# Patient Record
Sex: MALE | Race: WHITE | Employment: OTHER | URBAN - METROPOLITAN AREA
[De-identification: names, ages, dates, MRNs, and addresses within clinical notes are randomized per-mention and may not be internally consistent; named-entity substitution may affect disease eponyms.]

---

## 2018-03-28 ENCOUNTER — HOSPITAL ENCOUNTER (OUTPATIENT)
Dept: CARDIAC CATH/INVASIVE PROCEDURES | Age: 58
Discharge: HOME | End: 2018-03-29
Attending: INTERNAL MEDICINE | Admitting: INTERNAL MEDICINE
Payer: COMMERCIAL

## 2018-03-28 DIAGNOSIS — I25.10 CAD, MULTIPLE VESSEL: ICD-10-CM

## 2018-03-28 LAB
ABO/RH: NORMAL
ANTIBODY SCREEN: NORMAL
HCT VFR BLD CALC: 46.5 % (ref 37–54)
HEMOGLOBIN: 16.1 G/DL (ref 12.5–16.5)
INR BLD: 1.1
MCH RBC QN AUTO: 29.3 PG (ref 26–35)
MCHC RBC AUTO-ENTMCNC: 34.6 % (ref 32–34.5)
MCV RBC AUTO: 84.7 FL (ref 80–99.9)
PDW BLD-RTO: 14.4 FL (ref 11.5–15)
PLATELET # BLD: 249 E9/L (ref 130–450)
PMV BLD AUTO: 10.5 FL (ref 7–12)
PROTHROMBIN TIME: 12.2 SEC (ref 9.3–12.4)
RBC # BLD: 5.49 E12/L (ref 3.8–5.8)
WBC # BLD: 7.9 E9/L (ref 4.5–11.5)

## 2018-03-28 PROCEDURE — 86900 BLOOD TYPING SEROLOGIC ABO: CPT

## 2018-03-28 PROCEDURE — 6360000002 HC RX W HCPCS

## 2018-03-28 PROCEDURE — 36415 COLL VENOUS BLD VENIPUNCTURE: CPT

## 2018-03-28 PROCEDURE — 2709999900 HC NON-CHARGEABLE SUPPLY

## 2018-03-28 PROCEDURE — 2500000003 HC RX 250 WO HCPCS

## 2018-03-28 PROCEDURE — C1887 CATHETER, GUIDING: HCPCS

## 2018-03-28 PROCEDURE — 6370000000 HC RX 637 (ALT 250 FOR IP): Performed by: INTERNAL MEDICINE

## 2018-03-28 PROCEDURE — C9600 PERC DRUG-EL COR STENT SING: HCPCS | Performed by: INTERNAL MEDICINE

## 2018-03-28 PROCEDURE — 2580000003 HC RX 258: Performed by: INTERNAL MEDICINE

## 2018-03-28 PROCEDURE — C1769 GUIDE WIRE: HCPCS

## 2018-03-28 PROCEDURE — C1725 CATH, TRANSLUMIN NON-LASER: HCPCS

## 2018-03-28 PROCEDURE — C1874 STENT, COATED/COV W/DEL SYS: HCPCS

## 2018-03-28 PROCEDURE — 85610 PROTHROMBIN TIME: CPT

## 2018-03-28 PROCEDURE — 6370000000 HC RX 637 (ALT 250 FOR IP)

## 2018-03-28 PROCEDURE — 86850 RBC ANTIBODY SCREEN: CPT

## 2018-03-28 PROCEDURE — 93455 CORONARY ART/GRFT ANGIO S&I: CPT | Performed by: INTERNAL MEDICINE

## 2018-03-28 PROCEDURE — 85027 COMPLETE CBC AUTOMATED: CPT

## 2018-03-28 PROCEDURE — C1894 INTRO/SHEATH, NON-LASER: HCPCS

## 2018-03-28 PROCEDURE — 86901 BLOOD TYPING SEROLOGIC RH(D): CPT

## 2018-03-28 RX ORDER — ACETAMINOPHEN 325 MG/1
650 TABLET ORAL EVERY 4 HOURS PRN
Status: DISCONTINUED | OUTPATIENT
Start: 2018-03-28 | End: 2018-03-29 | Stop reason: HOSPADM

## 2018-03-28 RX ORDER — SODIUM CHLORIDE 9 MG/ML
INJECTION, SOLUTION INTRAVENOUS CONTINUOUS
Status: ACTIVE | OUTPATIENT
Start: 2018-03-28 | End: 2018-03-29

## 2018-03-28 RX ORDER — ALLOPURINOL 300 MG/1
300 TABLET ORAL DAILY
Status: DISCONTINUED | OUTPATIENT
Start: 2018-03-29 | End: 2018-03-29 | Stop reason: HOSPADM

## 2018-03-28 RX ORDER — CITALOPRAM 20 MG/1
40 TABLET ORAL DAILY
Status: DISCONTINUED | OUTPATIENT
Start: 2018-03-29 | End: 2018-03-29 | Stop reason: HOSPADM

## 2018-03-28 RX ORDER — ONDANSETRON 2 MG/ML
4 INJECTION INTRAMUSCULAR; INTRAVENOUS EVERY 6 HOURS PRN
Status: DISCONTINUED | OUTPATIENT
Start: 2018-03-28 | End: 2018-03-29 | Stop reason: HOSPADM

## 2018-03-28 RX ORDER — NITROGLYCERIN 0.4 MG/1
0.4 TABLET SUBLINGUAL EVERY 5 MIN PRN
Status: DISCONTINUED | OUTPATIENT
Start: 2018-03-28 | End: 2018-03-29 | Stop reason: HOSPADM

## 2018-03-28 RX ORDER — METOPROLOL SUCCINATE 100 MG/1
100 TABLET, EXTENDED RELEASE ORAL DAILY
Status: DISCONTINUED | OUTPATIENT
Start: 2018-03-29 | End: 2018-03-29 | Stop reason: HOSPADM

## 2018-03-28 RX ORDER — ALPRAZOLAM 0.25 MG/1
0.5 TABLET ORAL 4 TIMES DAILY PRN
Status: DISCONTINUED | OUTPATIENT
Start: 2018-03-28 | End: 2018-03-29 | Stop reason: HOSPADM

## 2018-03-28 RX ORDER — ATORVASTATIN CALCIUM 40 MG/1
80 TABLET, FILM COATED ORAL NIGHTLY
Status: DISCONTINUED | OUTPATIENT
Start: 2018-03-28 | End: 2018-03-29 | Stop reason: HOSPADM

## 2018-03-28 RX ORDER — SODIUM CHLORIDE 9 MG/ML
INJECTION, SOLUTION INTRAVENOUS ONCE
Status: COMPLETED | OUTPATIENT
Start: 2018-03-28 | End: 2018-03-28

## 2018-03-28 RX ORDER — ASPIRIN 81 MG/1
81 TABLET, CHEWABLE ORAL DAILY
Status: DISCONTINUED | OUTPATIENT
Start: 2018-03-29 | End: 2018-03-29 | Stop reason: HOSPADM

## 2018-03-28 RX ORDER — OXYCODONE HYDROCHLORIDE 10 MG/1
10 TABLET ORAL EVERY 6 HOURS PRN
Status: DISCONTINUED | OUTPATIENT
Start: 2018-03-28 | End: 2018-03-29 | Stop reason: HOSPADM

## 2018-03-28 RX ORDER — ENALAPRIL MALEATE 5 MG/1
5 TABLET ORAL DAILY
Status: DISCONTINUED | OUTPATIENT
Start: 2018-03-28 | End: 2018-03-29

## 2018-03-28 RX ADMIN — TIOTROPIUM BROMIDE 18 MCG: 18 CAPSULE ORAL; RESPIRATORY (INHALATION) at 23:57

## 2018-03-28 RX ADMIN — DESMOPRESSIN ACETATE 80 MG: 0.2 TABLET ORAL at 23:56

## 2018-03-28 RX ADMIN — SODIUM CHLORIDE: 9 INJECTION, SOLUTION INTRAVENOUS at 12:37

## 2018-03-28 RX ADMIN — ENALAPRIL MALEATE 5 MG: 5 TABLET ORAL at 21:51

## 2018-03-28 RX ADMIN — TICAGRELOR 90 MG: 90 TABLET ORAL at 23:56

## 2018-03-28 RX ADMIN — SODIUM CHLORIDE: 9 INJECTION, SOLUTION INTRAVENOUS at 21:52

## 2018-03-28 ASSESSMENT — PAIN SCALES - GENERAL
PAINLEVEL_OUTOF10: 0
PAINLEVEL_OUTOF10: 0

## 2018-03-28 NOTE — PROGRESS NOTES
Informed Dr Viry Green of /93.  ordered to resume home meds, including antihypertensives and to call if any further problems.

## 2018-03-29 VITALS
DIASTOLIC BLOOD PRESSURE: 90 MMHG | BODY MASS INDEX: 33.33 KG/M2 | TEMPERATURE: 97.7 F | SYSTOLIC BLOOD PRESSURE: 168 MMHG | HEIGHT: 69 IN | WEIGHT: 225 LBS | OXYGEN SATURATION: 97 % | HEART RATE: 58 BPM | RESPIRATION RATE: 16 BRPM

## 2018-03-29 LAB
ANION GAP SERPL CALCULATED.3IONS-SCNC: 14 MMOL/L (ref 7–16)
BUN BLDV-MCNC: 11 MG/DL (ref 6–20)
CALCIUM SERPL-MCNC: 8.7 MG/DL (ref 8.6–10.2)
CHLORIDE BLD-SCNC: 98 MMOL/L (ref 98–107)
CO2: 25 MMOL/L (ref 22–29)
CREAT SERPL-MCNC: 0.9 MG/DL (ref 0.7–1.2)
GFR AFRICAN AMERICAN: >60
GFR NON-AFRICAN AMERICAN: >60 ML/MIN/1.73
GLUCOSE BLD-MCNC: 89 MG/DL (ref 74–109)
HCT VFR BLD CALC: 44.2 % (ref 37–54)
HEMOGLOBIN: 14.8 G/DL (ref 12.5–16.5)
MAGNESIUM: 2.1 MG/DL (ref 1.6–2.6)
MCH RBC QN AUTO: 28.4 PG (ref 26–35)
MCHC RBC AUTO-ENTMCNC: 33.5 % (ref 32–34.5)
MCV RBC AUTO: 84.8 FL (ref 80–99.9)
PDW BLD-RTO: 14.4 FL (ref 11.5–15)
PLATELET # BLD: 221 E9/L (ref 130–450)
PMV BLD AUTO: 10.7 FL (ref 7–12)
POTASSIUM REFLEX MAGNESIUM: 3.5 MMOL/L (ref 3.5–5)
RBC # BLD: 5.21 E12/L (ref 3.8–5.8)
SODIUM BLD-SCNC: 137 MMOL/L (ref 132–146)
WBC # BLD: 7.5 E9/L (ref 4.5–11.5)

## 2018-03-29 PROCEDURE — 80048 BASIC METABOLIC PNL TOTAL CA: CPT

## 2018-03-29 PROCEDURE — 85027 COMPLETE CBC AUTOMATED: CPT

## 2018-03-29 PROCEDURE — 36415 COLL VENOUS BLD VENIPUNCTURE: CPT

## 2018-03-29 PROCEDURE — 6370000000 HC RX 637 (ALT 250 FOR IP): Performed by: INTERNAL MEDICINE

## 2018-03-29 PROCEDURE — 83735 ASSAY OF MAGNESIUM: CPT

## 2018-03-29 RX ORDER — ENALAPRIL MALEATE 10 MG/1
10 TABLET ORAL DAILY
Qty: 30 TABLET | Refills: 3 | Status: SHIPPED | OUTPATIENT
Start: 2018-03-29

## 2018-03-29 RX ORDER — POTASSIUM CHLORIDE 20 MEQ/1
40 TABLET, EXTENDED RELEASE ORAL ONCE
Status: COMPLETED | OUTPATIENT
Start: 2018-03-29 | End: 2018-03-29

## 2018-03-29 RX ORDER — AMLODIPINE BESYLATE 5 MG/1
5 TABLET ORAL DAILY
Qty: 30 TABLET | Refills: 3 | Status: SHIPPED | OUTPATIENT
Start: 2018-03-29

## 2018-03-29 RX ORDER — ENALAPRIL MALEATE 10 MG/1
10 TABLET ORAL DAILY
Status: DISCONTINUED | OUTPATIENT
Start: 2018-03-29 | End: 2018-03-29 | Stop reason: HOSPADM

## 2018-03-29 RX ORDER — AMLODIPINE BESYLATE 5 MG/1
5 TABLET ORAL DAILY
Status: DISCONTINUED | OUTPATIENT
Start: 2018-03-29 | End: 2018-03-29 | Stop reason: HOSPADM

## 2018-03-29 RX ADMIN — CITALOPRAM 40 MG: 20 TABLET, FILM COATED ORAL at 08:37

## 2018-03-29 RX ADMIN — METOPROLOL SUCCINATE 100 MG: 100 TABLET, FILM COATED, EXTENDED RELEASE ORAL at 12:12

## 2018-03-29 RX ADMIN — ASPIRIN 81 MG 81 MG: 81 TABLET ORAL at 08:37

## 2018-03-29 RX ADMIN — TIOTROPIUM BROMIDE 18 MCG: 18 CAPSULE ORAL; RESPIRATORY (INHALATION) at 08:37

## 2018-03-29 RX ADMIN — POTASSIUM CHLORIDE 40 MEQ: 20 TABLET, EXTENDED RELEASE ORAL at 08:37

## 2018-03-29 RX ADMIN — TICAGRELOR 90 MG: 90 TABLET ORAL at 08:37

## 2018-03-29 RX ADMIN — ALLOPURINOL 300 MG: 300 TABLET ORAL at 08:37

## 2018-03-29 ASSESSMENT — PAIN SCALES - GENERAL
PAINLEVEL_OUTOF10: 0

## 2018-03-29 NOTE — DISCHARGE SUMMARY
atraumatic  NECK:  Supple, symmetrical, trachea midline, no adenopathy, thyroid symmetric, not enlarged and no tenderness, skin normal  LUNGS:  No increased work of breathing, good air exchange, clear to auscultation bilaterally, no crackles or wheezing  CARDIOVASCULAR:  Normal apical impulse, regular rate and rhythm, normal S1 and S2, no S3 or S4, and no murmur noted, no edema, no JVD, no carotid bruit. ABDOMEN:  Soft, nontender, no masses, no hepatomegaly, no splenomegaly, BS+  MUSCULOSKELETAL:  No clubbing no cyanosis. there is no redness, warmth, or swelling of the joints, the relatively narrow loops okay, no swelling, no hematoma, no bruit no thrill  NEUROLOGIC:  Alert, awake,oriented x3  SKIN:  no bruising or bleeding, normal skin color, texture, turgor and no redness, warmth, or swelling    Disposition: home    Patient Instructions:    Ramo Johnson   Home Medication Instructions CPN:[id number]    Printed on:03/29/18 [id number]   Medication Information                      allopurinol (ZYLOPRIM) 300 MG tablet  Take 300 mg by mouth daily. amLODIPine (NORVASC) 5 MG tablet  Take 1 tablet by mouth daily             aspirin 81 MG EC tablet  Take 81 mg by mouth daily. atorvastatin (LIPITOR) 40 MG tablet  Take 40 mg by mouth daily. citalopram (CELEXA) 40 MG tablet               enalapril (VASOTEC) 10 MG tablet  Take 1 tablet by mouth daily             metoprolol (TOPROL-XL) 100 MG XL tablet  Take 100 mg by mouth daily. nitroGLYCERIN (NITROSTAT) 0.4 MG SL tablet  Place 0.4 mg under the tongue every 5 minutes as needed. oxyCODONE HCl (OXY-IR) 10 MG immediate release tablet  Take 1 tablet by mouth 3 times daily as needed for Pain  DO NOT FILL UNTIL 3-23-17. Rosalia Araujo Date: 3/23/17             ticagrelor (BRILINTA) 90 MG TABS tablet  Take 1 tablet by mouth 2 times daily             tiotropium (SPIRIVA) 18 MCG inhalation capsule  Inhale 18 mcg into

## 2018-03-29 NOTE — PROCEDURES
[address]                   [address] [place],  [facility]                              CARDIAC CATHETERIZATION    PATIENT NAME: Darwin Lee                    :        1960  MED REC NO:   [id number]                            ROOM:       6402  ACCOUNT NO:   [de-identified]                           ADMIT DATE: 2018  PROVIDER:     Jet Singh MD    DATE OF PROCEDURE:  2018    PROCEDURES:  1. Coronary angiography. 2.  Surgical grafts angiography. 3.  Percutaneous coronary intervention with a deployment of 2.75 x 18 mm  Xience Alpine drug-eluting stent in ostial and proximal first OM branch. Conscious sedation using Versed and fentanyl. INDICATION:  #4 with score of 7. Indication for PCI 17 with score of 18. HISTORY OF PRESENT ILLNESS:  The patient is a 33-year-old male, smoker with  CAD status post CABG. At the current time, he has been having dyspnea on  exertion and chest discomfort, has an  abnormal stress test, continued to  have symptoms despite his medical therapy, the patient was brought to the  cath lab to evaluate the anatomy of his coronary arteries with the  intention to intervene as warranted. DESCRIPTION OF PROCEDURE:  After the appropriate informed consent, both the  groins were prepped and draped in the usual sterile fashion. The right  groin area was locally anesthetized with 10 mL of 2% lidocaine. A Cook  needle was used to access the right common femoral artery using real-time  ultrasound guidance. A 6-Citizen of [place] introducer sheath was used to cannulate  the right common femoral artery. A 6-Citizen of [place] JL-4 and 6-Citizen of [place] JR-4  catheter were used for coronary angiography in multiple projections.   A  6-Citizen of [place] LIMA catheter was used for LIMA to LAD and LIMA to OM II  angiography and also for saphenous venous graft angiogram.    ANGIOGRAPHIC FINDINGS:  The left main coronary artery arising from the left sinus of Valsalva. It  is a good-sized vessel without any significant disease. It bifurcates into  left anterior descending artery and left circumflex artery. The left anterior descending artery has proximal total occlusion. The left circumflex artery is a large vessel, gives off a large first OM  branch. The first OM branch has 80-90% hazy long lesion in the ostium and  the proximal segment. The rest of the left circumflex artery has mild  disease. There is total occlusion of the second OM branch. The right coronary artery has inferior takeoff. It has a chronic total  occlusion. There is retrograde filling of the distal SVG to PDA. The right brachiocephalic trunk is tortuous, LUNDBERG to LAD is a mature vessel  without any disease. There is antegrade and retrograde filling of the LAD  and a large diagonal branch. The LIMA to OM II is also a mid Shore vessel without any significant  disease. After completing the coronary angiography, we proceeded with a percutaneous  coronary intervention. We tried to use a JL-4 guiding catheter but  unsuccessfully due to lack of support. Then we used CLS 3.5 catheter, BMW  wire was used to cross the lesion, the lesion was predilated using 2.0 x 15  mm balloon. This lesion was stented using 2.75 x 18 mm Xience Alpine  drug-eluting stent. There was 0% residual stenosis and MANDEEP III flow  distally. There was mild disease distal to the stent that was left alone  for medical therapy. COMPLICATIONS:  None. MEDICATIONS USED DURING THE PROCEDURE:  We used Angiomax for  anticoagulation, the patient was given 325 mg of aspirin and 180 mg of  Brilinta prior to leaving the cath lab area. CONSCIOUS SEDATION:  We used Versed and fentanyl for conscious sedation,  first dose was given at 15:12, procedure ended at 16:21, there was 69  minutes of direct face-to-face supervision during conscious sedation  administration. IMPRESSION:  1.   Significant disease involving

## 2018-04-18 LAB
EKG ATRIAL RATE: 51 BPM
EKG P AXIS: 55 DEGREES
EKG P-R INTERVAL: 196 MS
EKG Q-T INTERVAL: 526 MS
EKG QRS DURATION: 146 MS
EKG QTC CALCULATION (BAZETT): 484 MS
EKG R AXIS: 33 DEGREES
EKG T AXIS: 52 DEGREES
EKG VENTRICULAR RATE: 51 BPM

## 2023-06-26 ENCOUNTER — HOSPITAL ENCOUNTER (OUTPATIENT)
Dept: NUCLEAR MEDICINE | Age: 63
Discharge: HOME | End: 2023-06-26
Attending: INTERNAL MEDICINE
Payer: MEDICARE

## 2023-06-26 DIAGNOSIS — R91.1 SOLITARY PULMONARY NODULE: ICD-10-CM

## 2023-06-26 PROCEDURE — A9552 F18 FDG: HCPCS | Performed by: RADIOLOGY

## 2023-06-26 PROCEDURE — 3430000000 HC RX DIAGNOSTIC RADIOPHARMACEUTICAL: Performed by: RADIOLOGY

## 2023-06-26 RX ORDER — FLUDEOXYGLUCOSE F 18 200 MCI/ML
10 INJECTION, SOLUTION INTRAVENOUS
Status: COMPLETED | OUTPATIENT
Start: 2023-06-26 | End: 2023-06-26

## 2023-06-26 RX ADMIN — FLUDEOXYGLUCOSE F 18 10 MILLICURIE: 200 INJECTION, SOLUTION INTRAVENOUS at 16:13

## 2023-07-24 ENCOUNTER — HOSPITAL ENCOUNTER (OUTPATIENT)
Age: 63
Discharge: HOME | End: 2023-07-24
Payer: MEDICARE

## 2023-07-24 LAB
ANION GAP SERPL CALCULATED.3IONS-SCNC: 9 MMOL/L (ref 7–16)
BUN SERPL-MCNC: 11 MG/DL (ref 6–23)
CALCIUM SERPL-MCNC: 9.5 MG/DL (ref 8.6–10.2)
CHLORIDE SERPL-SCNC: 95 MMOL/L (ref 98–107)
CO2 SERPL-SCNC: 29 MMOL/L (ref 22–29)
CREAT SERPL-MCNC: 1.1 MG/DL (ref 0.7–1.2)
D DIMER: <200 NG/ML DDU (ref 0–232)
ERYTHROCYTE [DISTWIDTH] IN BLOOD BY AUTOMATED COUNT: 14 % (ref 11.5–15)
FIBRINOGEN PPP-MCNC: 366 MG/DL (ref 200–400)
GFR SERPL CREATININE-BSD FRML MDRD: >60 ML/MIN/1.73M2
GLUCOSE SERPL-MCNC: 99 MG/DL (ref 74–99)
HCT VFR BLD AUTO: 46.8 % (ref 37–54)
HGB BLD-MCNC: 15.5 G/DL (ref 12.5–16.5)
INR PPP: 1
MCH RBC QN AUTO: 29.5 PG (ref 26–35)
MCHC RBC AUTO-ENTMCNC: 33.1 G/DL (ref 32–34.5)
MCV RBC AUTO: 89 FL (ref 80–99.9)
PARTIAL THROMBOPLASTIN TIME: 26.6 SEC (ref 24.5–35.1)
PLATELET # BLD AUTO: 202 K/UL (ref 130–450)
PMV BLD AUTO: 9.8 FL (ref 7–12)
POTASSIUM SERPL-SCNC: 3.8 MMOL/L (ref 3.5–5)
PROTHROMBIN TIME: 11.8 SEC (ref 9.3–12.4)
RBC # BLD AUTO: 5.26 M/UL (ref 3.8–5.8)
SODIUM SERPL-SCNC: 133 MMOL/L (ref 132–146)
WBC OTHER # BLD: 6.5 K/UL (ref 4.5–11.5)

## 2023-07-24 PROCEDURE — 80048 BASIC METABOLIC PNL TOTAL CA: CPT

## 2023-07-24 PROCEDURE — 85610 PROTHROMBIN TIME: CPT

## 2023-07-24 PROCEDURE — 85730 THROMBOPLASTIN TIME PARTIAL: CPT

## 2023-07-24 PROCEDURE — 85027 COMPLETE CBC AUTOMATED: CPT

## 2023-07-24 PROCEDURE — 85384 FIBRINOGEN ACTIVITY: CPT

## 2023-07-24 PROCEDURE — 36415 COLL VENOUS BLD VENIPUNCTURE: CPT

## 2023-07-24 PROCEDURE — 85379 FIBRIN DEGRADATION QUANT: CPT

## 2023-07-27 ENCOUNTER — HOSPITAL ENCOUNTER (OUTPATIENT)
Dept: DATA CONVERSION | Facility: HOSPITAL | Age: 63
End: 2023-07-27
Attending: INTERNAL MEDICINE | Admitting: INTERNAL MEDICINE

## 2023-07-27 DIAGNOSIS — R09.89 OTHER SPECIFIED SYMPTOMS AND SIGNS INVOLVING THE CIRCULATORY AND RESPIRATORY SYSTEMS: ICD-10-CM

## 2023-07-27 DIAGNOSIS — R91.8 OTHER NONSPECIFIC ABNORMAL FINDING OF LUNG FIELD: ICD-10-CM

## 2023-07-27 DIAGNOSIS — R59.0 LOCALIZED ENLARGED LYMPH NODES: ICD-10-CM

## 2023-07-27 LAB
BASOPHILS (10*3/UL) IN BLOOD BY AUTOMATED COUNT: NORMAL
BASOPHILS/100 LEUKOCYTES IN BLOOD BY AUTOMATED COUNT: NORMAL
CD4/CD8 RATIO: NORMAL
CD45%: NORMAL
CELLS COUNTED TOTAL (#) IN BODY FLUID: 100
CLARITY FLUID: NORMAL
COLOR OF BODY FLUID: COLORLESS
EOSINOPHILS (10*3/UL) IN BLOOD BY AUTOMATED COUNT: NORMAL
EOSINOPHILS/100 LEUKOCYTES IN BLOOD BY AUTOMATED COUNT: NORMAL
EOSINOPHILS/100 LEUKOCYTES IN BODY FLUID BY MANUAL COUNT: 3 %
ERYTHROCYTE DISTRIBUTION WIDTH (RATIO) BY AUTOMATED COUNT: NORMAL
ERYTHROCYTE MEAN CORPUSCULAR HEMOGLOBIN CONCENTRATION (G/DL) BY AUTOMATED: NORMAL
ERYTHROCYTE MEAN CORPUSCULAR VOLUME (FL) BY AUTOMATED COUNT: NORMAL
ERYTHROCYTES (/UL) IN BODY FLUID: 1000 /UL
ERYTHROCYTES (10*6/UL) IN BLOOD BY AUTOMATED COUNT: NORMAL
FMETH: NORMAL
FSIT1: NORMAL
HEMATOCRIT (%) IN BLOOD BY AUTOMATED COUNT: NORMAL
HEMOGLOBIN (G/DL) IN BLOOD: NORMAL
IMMATURE GRANULOCYTES/100 LEUKOCYTES IN BLOOD BY AUTOMATED COUNT: NORMAL
LEUKOCYTES (/UL) IN BODY FLUID: 191 /UL
LEUKOCYTES (10*3/UL) IN BLOOD BY AUTOMATED COUNT: NORMAL
LYMPHOCYTES (10*3/UL) IN BLOOD BY AUTOMATED COUNT: NORMAL
LYMPHOCYTES/100 LEUKOCYTES IN BLOOD BY AUTOMATED COUNT: NORMAL
LYMPHOCYTES/100 LEUKOCYTES IN BODY FLUID BY MAN CT: 16 %
MANUAL DIFFERENTIAL Y/N: NORMAL
MARKER INTERPRETATION: NORMAL
MONOCYTES (10*3/UL) IN BLOOD BY AUTOMATED COUNT: NORMAL
MONOCYTES+MACROPHAGES/100 WBC IN BODY FLUID BY MAN CT: 69 %
MONOCYTES/100 LEUKOCYTES IN BLOOD BY AUTOMATED COUNT: NORMAL
NEUTROPHILS (10*3/UL) IN BLOOD BY AUTOMATED COUNT: NORMAL
NEUTROPHILS/100 LEUKOCYTES IN BLOOD BY AUTOMATED COUNT: NORMAL
NEUTROPHILS/100 LEUKOCYTES IN BODY FLUID BY MANUAL COUNT: 12 %
NRBC (PER 100 WBCS) BY AUTOMATED COUNT: NORMAL
PLATELETS (10*3/UL) IN BLOOD AUTOMATED COUNT: NORMAL

## 2023-07-28 LAB
CD4/CD8 RATIO: 0.2 (ref 1–3.5)
CD45%: 100 %
CP CD3+CD4+%: 15 % (ref 29–57)
CP CD3+CD8+%: 76 % (ref 7–31)
FMETH: ABNORMAL
FSIT1: ABNORMAL
MARKER INTERPRETATION: ABNORMAL

## 2023-07-29 LAB
CELL CT,FLUID PATH REVIEW: NORMAL
GRAM STAIN: NORMAL
RESPIRATORY CULTURE/SMEAR: NORMAL

## 2023-07-31 LAB
FUNGAL CULTURE/SMEAR: ABNORMAL
FUNGAL SMEAR: ABNORMAL

## 2023-08-03 LAB
CCOLL: NORMAL PER TUBE
CCOUN: 6.17 X10E9/L
COMPLETE PATHOLOGY REPORT: NORMAL
COMPLETE PATHOLOGY REPORT: NORMAL
CONVERTED ADDENDUM DIAGNOSIS 2: NORMAL
CONVERTED CLINICAL DIAGNOSIS-HISTORY: NORMAL
CONVERTED CLINICAL DIAGNOSIS-HISTORY: NORMAL
CONVERTED DIAGNOSIS COMMENT: NORMAL
CONVERTED FINAL DIAGNOSIS: NORMAL
CONVERTED FINAL DIAGNOSIS: NORMAL
CONVERTED FINAL REPORT PDF LINK TO COPY AND PASTE: NORMAL
CONVERTED FINAL REPORT PDF LINK TO COPY AND PASTE: NORMAL
CONVERTED GROSS DESCRIPTION: NORMAL
CONVERTED RAPID EVALUATION: NORMAL
CONVERTED SPECIMEN DESCRIPTION: NORMAL
FCTOR: NORMAL
FCTSO: NORMAL
FSITE: NORMAL
LANTB: NORMAL
LCD19: 35 % OF LYMPH
LCD4: 52 % OF LYMPH
LCD8: 11 % OF LYMPH
LGPD1: NORMAL
LGPNO: NORMAL
LNK: 2 % OF LYMPH
LPERC: 84 %
PV194: NORMAL
VIAB: NORMAL

## 2023-09-19 LAB
AFB CULTURE: NORMAL
AFB STAIN: NORMAL

## 2023-09-29 VITALS — WEIGHT: 209.44 LBS | BODY MASS INDEX: 31.74 KG/M2 | HEIGHT: 68 IN

## 2023-11-22 ENCOUNTER — APPOINTMENT (OUTPATIENT)
Dept: PULMONOLOGY | Facility: HOSPITAL | Age: 63
End: 2023-11-22
Payer: MEDICARE

## 2024-06-10 ENCOUNTER — HOSPITAL ENCOUNTER (OUTPATIENT)
Dept: NUCLEAR MEDICINE | Age: 64
Discharge: HOME | End: 2024-06-10
Attending: INTERNAL MEDICINE
Payer: MEDICARE

## 2024-06-10 DIAGNOSIS — R91.1 SOLITARY PULMONARY NODULE: ICD-10-CM

## 2024-06-10 PROCEDURE — A9609 HC RX DIAGNOSTIC RADIOPHARMACEUTICAL: HCPCS | Performed by: RADIOLOGY

## 2024-06-10 PROCEDURE — 78815 PET IMAGE W/CT SKULL-THIGH: CPT

## 2024-06-10 PROCEDURE — 3430000000 HC RX DIAGNOSTIC RADIOPHARMACEUTICAL: Performed by: RADIOLOGY

## 2024-06-10 RX ORDER — FLUDEOXYGLUCOSE F 18 200 MCI/ML
10 INJECTION, SOLUTION INTRAVENOUS
Status: COMPLETED | OUTPATIENT
Start: 2024-06-10 | End: 2024-06-10

## 2024-06-10 RX ADMIN — FLUDEOXYGLUCOSE F 18 10 MILLICURIE: 200 INJECTION, SOLUTION INTRAVENOUS at 15:48

## 2024-07-11 PROBLEM — R59.0 MEDIASTINAL ADENOPATHY: Status: ACTIVE | Noted: 2024-07-11

## 2024-07-11 NOTE — PROGRESS NOTES
Bronchoscopy Scheduling Request    Pre-bronchoscopy visit: New patient visit with Bronchoscopy group provider  Please schedule procedure: Next available    Cytology on-site:  Yes  Location:  Either location  Performing physician:  Advanced diagnostic bronchoscopist  Referring physician:   Dr. Asad Muse  Indication: Mediastinal LAD  Sedation / Anesthesia:  GA  Procedure:  Airway Examination, Diagnostic EBUS  Time:  Tier 2  Fluorscopy:   No  Imaging needed:  None  Labs:  CBC, BMP  Meds:  None  Special Considerations:  PAT; Signification Emphysema; please confirm medication list/history for bronch; Consider BAL, EBBx if possible sarcoid protocol; had bronch 7/2023- extensive workup sent from LAD, even flow, re-evaluate testing needs, now also having hemoptysis per referral.    Reviewed by:  Kimberly Serrano MD

## 2024-07-16 NOTE — PROGRESS NOTES
Patient: Sam Patterson    [id number]  : 1960 -- AGE 63 y.o.    Provider: SARAH Mcrae-CNP     Location [facility]   Service Date: 2024       Department of Medicine  Division of Pulmonary, Critical Care, and Sleep Medicine       [facility]  New Visit Note    Virtual or Telephone Consent  A telephone visit (audio only) between the patient (at the originating site) and the provider (at the distant site) was utilized to provide this telehealth service.   Verbal consent was requested and obtained from Sam Patterson on this date, 24 for a telehealth visit.     HISTORY OF PRESENT ILLNESS     Pulm: Dr. Asad Muse  Cardiology: Dr. Estephanie Corrales     HISTORY OF PRESENT ILLNESS   Sam Patterson is a 63 y.o. male who presents to a [facility] for an evaluation with concerns of Mediastinal adenopathy.  I have independently interviewed and examined the patient in the office and reviewed available records.    Current History  Current pulmonary patient of Dr. Asad Muse; referred to the  interventional pulmonary team with concern for mediastinal adenopathy. Most recent PET CT from 6/10/24 showing mildly increased hypermetabolic activity within enlarged thoracic lymph nodes upon comparison from 23 PET CT. Patient had previously underwent bronchoscopy with our team last year on 23 in which pathology and findings were benign/inconclusive. Patient was referred back by his pulmonologist for further assessment and tissue biopsy. He also does have a notable hx of colon CA.    On today's visit, the patient reports no SOB at rest but does have MANN; worse when walking up stairs or carrying in items in to the house. Takes Symbicort 160 daily. Will use Albuterol HFA usually 1-2x a week. Reports cough. Intermittent productive; yellow. A few weeks ago, was having some hemoptysis (mostly streaks of  blood); but has since resolved. Hx of CABG x4 (2002) and PCI (2016). He takes Plavix daily (last dose taken this morning). Intermittent orthopnea. No lower leg swelling. Has GERD but controlled on Pepcid. Intermittent chest tightness; last took nitroglycerin 1 week ago. Sees cardiology.     Denies premature birth. No childhood pulmonary issues growing up. No pulmonary hospitalizations. Has never been on home oxygen therapy before.    He does have known severe KARI; split night study on 1/29/24. Wears CPAP intermittently.      Prior Notes & History       The above is a note from pulmonologist Dr. Asad Muse    Pulmonary Note (Jasmyne Morrison, CNP)  7/25/23: History of Present Illness     Mr. Sam Patterson is a 62 y.o man, former smoker (1.5-2 PPD for 40 years, quit at age 62 ), being evaluated for lung nodules.      PCP: Asad Muse     HPI:  He was informed he has multiple nodules requiring bronchoscopy. At baseline, he has dyspnea on exertion, but occ at rest. His symptoms started many years ago, but has mostly been stable. He currently sits for most of the day, works inside the house, but does not carry loads and do strenuous exercise. He has to stop for breath when walking at her own pace on level ground at about 15 minutes (mMRC 2). He denies orthopnea, pnd, or betsy. He has gained X10 pounds in the last X 12 months. He also relates chronic productive cough with yellow green, denies wheezing/ blood streaks. C/o night cough improved with stopping smoking. No hemoptysis. No fever or shivering chills. He has no runny nose, or a tingling sensation in the back of his throat. Also denies heartburn. He has occ left sided chest pain. reports headache intense does not last long.         Previous pulmonary history:   He has no history of recurrent infections, or lung disease as a child. He had no previous lung hx, never on oxygen or inhaler therapy.      Inhalers/nebulized medications: Spiriva       Hospitalization History:  He has not been hospitalized over the last year for breathing related problem.     Sleep history:  Denies snoring, apnea, feeling tired during the day or taking naps during the day.      Comorbidities:  colon cancer in 2008 - colectomy and adjuvant chemo  lung nodules  CABG 2002 x 4, CAD  HLD  COPD     SH:  smoking: former smoker (1.5-2 PPD for 40 years, quit at age 62  drinking: none  illicit drug use: occ MJ 1-2 week     Occupation:   Previously worked as  for paint shop and a sandblaster - [employer]  and   Currently works as disabled      Family History:  father  brain tumor  two sisters have breast cancer   brother daughter breast cancer   mom ovarian cancer   Does not recall if family was tested for BRCA gene  REVIEW OF SYSTEMS     REVIEW OF SYSTEMS  Review of Systems   Constitutional:  Positive for fatigue. Negative for activity change, appetite change, chills, fever and unexpected weight change.   HENT:  Negative for congestion, postnasal drip, rhinorrhea, sinus pressure, sinus pain, sneezing, sore throat, trouble swallowing and voice change.         Denies throat clearing   Eyes:  Negative for redness and itching.   Respiratory:  Positive for cough, chest tightness and shortness of breath. Negative for wheezing and stridor.    Cardiovascular:  Positive for chest pain. Negative for palpitations and leg swelling.        Denies orthopnea   Gastrointestinal:  Negative for abdominal pain, diarrhea, nausea and vomiting.        Denies acid reflux   Musculoskeletal:  Positive for arthralgias and back pain. Negative for joint swelling and myalgias.   Skin:  Negative for rash.   Allergic/Immunologic: Negative for immunocompromised state.   Neurological:  Negative for dizziness, tremors, weakness and headaches.   Hematological:  Bruises/bleeds easily.   Psychiatric/Behavioral:  Positive for sleep disturbance. Negative for agitation. The patient is  not nervous/anxious.         Denies depression   All other systems reviewed and are negative.      ALLERGIES AND MEDICATIONS     ALLERGIES  No Known Allergies    MEDICATIONS  Current Outpatient Medications   Medication Sig Dispense Refill    albuterol 90 mcg/actuation inhaler Inhale 2 puffs every 4 hours if needed.      allopurinol (Zyloprim) 300 mg tablet Take 1 tablet (300 mg) by mouth once daily.      citalopram (CeleXA) 40 mg tablet Take 1 tablet (40 mg) by mouth once daily.      clopidogrel (Plavix) 75 mg tablet Take 1 tablet (75 mg) by mouth once daily.      enalapril (Vasotec) 20 mg tablet Take 1 tablet (20 mg) by mouth once daily.      famotidine (Pepcid) 40 mg tablet Take 1 tablet (40 mg) by mouth early in the morning..      hydroCHLOROthiazide (HYDRODiuril) 25 mg tablet Take 1 tablet (25 mg) by mouth once daily.      isosorbide dinitrate (Isordil) 10 mg tablet Take 1 tablet (10 mg) by mouth once daily.      metoprolol succinate XL (Toprol-XL) 100 mg 24 hr tablet Take 1 tablet (100 mg) by mouth 2 times a day.      nitroglycerin (Nitrostat) 0.4 mg SL tablet Place 1 tablet (0.4 mg) under the tongue every 5 minutes if needed for chest pain.      oxyCODONE (Roxicodone) 10 mg immediate release tablet Take 1 tablet (10 mg) by mouth every 8 hours if needed for moderate pain (4 - 6) or severe pain (7 - 10).      rosuvastatin (Crestor) 20 mg tablet Take 1 tablet (20 mg) by mouth once daily.      Symbicort 160-4.5 mcg/actuation inhaler Inhale 2 puffs 2 times a day. Rinse mouth after use       No current facility-administered medications for this visit.       PAST HISTORY     PAST MEDICAL HISTORY  He  has a past medical history of Colon cancer (Multi), COPD (chronic obstructive pulmonary disease) (Multi), Depression, Emphysema lung (Multi), Hyperlipidemia, Hypertension, and MI (myocardial infarction) (Multi).    PAST SURGICAL HISTORY  Past Surgical History:   Procedure Laterality Date    COLON SURGERY      CORONARY  ANGIOPLASTY WITH STENT PLACEMENT         IMMUNIZATION HISTORY  Immunization History   Administered Date(s) Administered    Flu vaccine (IIV4), preservative free *Check age/dose* 10/03/2018, 09/10/2020, 11/22/2021    Flu vaccine, quadrivalent, high-dose, preservative free, age 65y+ (FLUZONE) 02/28/2024    Flu vaccine, quadrivalent, no egg protein, age 6 month or greater (FLUCELVAX) 09/27/2022    Influenza Whole 09/02/2014    Influenza, injectable, quadrivalent 10/03/2019    Influenza, seasonal, injectable, preservative free 11/07/2015, 10/18/2016    Moderna COVID-19 vaccine, bivalent, blue cap/gray label *Check age/dose* 03/07/2023    Moderna SARS-CoV-2 Vaccination 02/25/2021, 03/25/2021, 11/12/2021    Novel influenza-H1N1-09, preservative-free 12/03/2009    Pneumococcal conjugate vaccine, 13-valent (PREVNAR 13) 11/07/2015    Pneumococcal conjugate vaccine, 20-valent (PREVNAR 20) 09/27/2022    RSV, 60 Years And Older (AREXVY) 12/02/2023       SOCIAL HISTORY  He  reports that he quit smoking about 12 months ago. His smoking use included cigarettes. He started smoking about 51 years ago. He has a 126.2 pack-year smoking history. He has been exposed to tobacco smoke. He has never used smokeless tobacco. He reports current alcohol use. He reports current drug use. Drug: Marijuana.     FAMILY HISTORY  No family history on file.      PHYSICAL EXAM       PREVIOUS WEIGHTS:  Wt Readings from Last 3 Encounters:   No data found for Wt       Physical Exam  No physical exam performed; virtual visit.    RESULTS/DATA     Pulmonary Function Test Results     No PFT on record    Chest Radiograph     No results found for this or any previous visit from the past 365 days.      Chest CT Scan   PET CT  6/10/24: Impression: Mild interval increase in metabolic activity within enlarged thoracic lymph nodes since 06/26/2023.No new hypermetabolic foci are identified in the neck, chest chest abdomen or pelvis   6/26/23: Hypermetabolic lymph  "nodes throughout the mediastinum and at the jaci bilaterally, and to a lesser extent within the cervical chains and inguinal regions, for which lymphoma, chantale metastatic disease, or sarcoid are among leading differential considerations. Nonspecific diffuse activity is seen associated with right-sided airspace disease and left pleural thickening, potentially inflammatory.  Continued attention on CT follow-up is recommended.     Echocardiogram & Cardiac Studies     No results found for this or any previous visit from the past 365 days.       Labwork & Pathology   Complete Blood Count  Lab Results   Component Value Date    WBC CANCELED 07/27/2023    HGB CANCELED 07/27/2023    HCT CANCELED 07/27/2023    MCV CANCELED 07/27/2023    PLT CANCELED 07/27/2023       Peripheral Eosinophil Count:   Eosinophils Absolute (no units)   Date Value   07/27/2023 CANCELED       Immunocap IgE  No results found for: \"ICIGE\"    Bronchoscopy & Pathology/Cultures   Pathology  7/27/23: FINAL DIAGNOSIS   A: LYMPH NODE, 11 RS, CORE BIOPSY:   --  LYMPH NODE WITH FOLLICULAR AND PARAFOLLICULAR HYPERPLASIA, FAVOR REACTIVE.     Respiratory Culture  Lab Results   Component Value Date    RESPCULTSM  07/27/2023       ~NORMAL THROAT ZE, CULTURE IN PROGRESS.~  2+ NORMAL THROAT ZE.    GRAMSTAIN NO GRANULOCYTES OR ORGANISMS SEEN. 07/27/2023     No results found for the last 90 days.      Fungal Culture  Lab Results   Component Value Date    FUNGALCULTSM Candida glabrata (A) 07/27/2023    FUNGALSMEAR FLUORESCENT FUNGAL STAIN: NEGATIVE 07/27/2023       AFB Culture  Lab Results   Component Value Date    AFBCX NO MYCOBACTERIA ISOLATED. 07/27/2023    AFBSTAIN ACID FAST SMEAR - NEGATIVE. 07/27/2023         ASSESSMENT/PLAN     Mr. Patterson is a 63 y.o. male; was referred to the [facility] for evaluation of mediastinal adenopathy.    Problem List and Orders  Diagnoses and all orders for this visit:  Mediastinal " adenopathy      Assessment and Plan / Recommendations:  Patient's visit was converted to a virtual visit.    1. Mediastinal Adenopathy: Current pulmonary patient of Dr. Asad Muse; referred to the  interventional pulmonary team with concern for mediastinal adenopathy. Most recent PET CT from 6/10/24 showing mildly increased hypermetabolic activity within enlarged thoracic lymph nodes upon comparison from 6/26/23 PET CT. Patient had previously underwent bronchoscopy with our team last year on 7/27/23 in which pathology and findings were benign/inconclusive. Patient was referred back by his pulmonologist for further assessment and tissue biopsy. He also does have a notable hx of colon CA.  - Plan for bronchoscopy with biopsy. Airway Examination, Diagnostic EBUS    - Lab work prior to procedure; patient knows this needs completed  - Patient is on Plavix; he took a dose today on 7/19/24. (Case will need to be rescheduled due to this)  - Patient also reports he has intermittent chest pains; last took nitroglycerin 1 week ago. Sees a cardiologist. Hx of CABG x4 (2002) and PCI in 2016.   - These concerns were communicated to Dr. Wood and bronchoscopy coordinators.   Special Considerations:  Signification Emphysema; please confirm medication list/history for bronch; Consider BAL, EBBx if possible sarcoid protocol; had bronch 7/2023- extensive workup sent from LAD, even flow, re-evaluate testing needs, now also having hemoptysis per referral.       I explained the procedure to the patient. We discussed that the bronchoscopy will be performed by a member of the Interventional Pulmonary Team; depending on scheduling and provider availability. We also discussed that the IP providers function as a team and not infrequently may have to fill in for one another if there are emergent issues that need attention at the same time. The patient / family expressed understanding and agreed to proceed. All questions were answered.      Patient's visit was converted to a virtual visit. Spoke with the patient via phone for 23 minutes.    Prep: 10 minutes  Phone/Video: 23 minutes  Total: 33 minutes

## 2024-07-19 ENCOUNTER — TELEMEDICINE (OUTPATIENT)
Dept: PULMONOLOGY | Facility: CLINIC | Age: 64
End: 2024-07-19
Payer: MEDICARE

## 2024-07-19 DIAGNOSIS — R59.0 MEDIASTINAL ADENOPATHY: Primary | ICD-10-CM

## 2024-07-19 PROBLEM — F17.210 CIGARETTE SMOKER: Status: ACTIVE | Noted: 2023-04-19

## 2024-07-19 PROBLEM — R06.09 CHRONIC DYSPNEA: Status: ACTIVE | Noted: 2023-04-19

## 2024-07-19 PROBLEM — G47.33 OBSTRUCTIVE SLEEP APNEA OF ADULT: Status: ACTIVE | Noted: 2023-08-16

## 2024-07-19 PROBLEM — I25.10 CORONARY ARTERIOSCLEROSIS: Status: ACTIVE | Noted: 2018-03-28

## 2024-07-19 PROBLEM — R59.1 LYMPHADENOPATHY: Status: ACTIVE | Noted: 2023-08-16

## 2024-07-19 PROBLEM — R05.3 CHRONIC COUGH: Status: ACTIVE | Noted: 2023-04-19

## 2024-07-19 PROCEDURE — 1159F MED LIST DOCD IN RCRD: CPT | Performed by: NURSE PRACTITIONER

## 2024-07-19 PROCEDURE — 1160F RVW MEDS BY RX/DR IN RCRD: CPT | Performed by: NURSE PRACTITIONER

## 2024-07-19 PROCEDURE — 99443 PR PHYS/QHP TELEPHONE EVALUATION 21-30 MIN: CPT | Performed by: NURSE PRACTITIONER

## 2024-07-19 PROCEDURE — 1036F TOBACCO NON-USER: CPT | Performed by: NURSE PRACTITIONER

## 2024-07-19 RX ORDER — ALLOPURINOL 300 MG/1
300 TABLET ORAL DAILY
COMMUNITY

## 2024-07-19 RX ORDER — FAMOTIDINE 40 MG/1
1 TABLET, FILM COATED ORAL
COMMUNITY
Start: 2023-10-14

## 2024-07-19 RX ORDER — OXYCODONE HYDROCHLORIDE 10 MG/1
10 TABLET ORAL EVERY 8 HOURS PRN
COMMUNITY

## 2024-07-19 RX ORDER — CITALOPRAM 40 MG/1
40 TABLET, FILM COATED ORAL DAILY
COMMUNITY

## 2024-07-19 RX ORDER — ALBUTEROL SULFATE 90 UG/1
2 AEROSOL, METERED RESPIRATORY (INHALATION) EVERY 4 HOURS PRN
COMMUNITY
Start: 2023-08-16

## 2024-07-19 RX ORDER — ISOSORBIDE DINITRATE 10 MG/1
10 TABLET ORAL DAILY
COMMUNITY

## 2024-07-19 RX ORDER — ENALAPRIL MALEATE 20 MG/1
20 TABLET ORAL DAILY
COMMUNITY

## 2024-07-19 RX ORDER — METOPROLOL SUCCINATE 100 MG/1
100 TABLET, EXTENDED RELEASE ORAL 2 TIMES DAILY
COMMUNITY

## 2024-07-19 RX ORDER — CLOPIDOGREL BISULFATE 75 MG/1
75 TABLET ORAL DAILY
COMMUNITY
End: 2024-07-19 | Stop reason: ALTCHOICE

## 2024-07-19 RX ORDER — ROSUVASTATIN CALCIUM 20 MG/1
20 TABLET, COATED ORAL DAILY
COMMUNITY

## 2024-07-19 RX ORDER — HYDROCHLOROTHIAZIDE 25 MG/1
25 TABLET ORAL DAILY
COMMUNITY

## 2024-07-19 RX ORDER — CLOPIDOGREL BISULFATE 75 MG/1
75 TABLET ORAL DAILY
COMMUNITY

## 2024-07-19 RX ORDER — BUDESONIDE AND FORMOTEROL FUMARATE DIHYDRATE 160; 4.5 UG/1; UG/1
2 AEROSOL RESPIRATORY (INHALATION) 2 TIMES DAILY
COMMUNITY

## 2024-07-19 RX ORDER — NITROGLYCERIN 0.4 MG/1
0.4 TABLET SUBLINGUAL EVERY 5 MIN PRN
COMMUNITY

## 2024-07-19 ASSESSMENT — ENCOUNTER SYMPTOMS
TROUBLE SWALLOWING: 0
ACTIVITY CHANGE: 0
AGITATION: 0
CHILLS: 0
VOICE CHANGE: 0
PALPITATIONS: 0
BACK PAIN: 1
RHINORRHEA: 0
SHORTNESS OF BREATH: 1
NAUSEA: 0
ROS GI COMMENTS: DENIES ACID REFLUX
STRIDOR: 0
TREMORS: 0
UNEXPECTED WEIGHT CHANGE: 0
COUGH: 1
MYALGIAS: 0
DIARRHEA: 0
NERVOUS/ANXIOUS: 0
JOINT SWELLING: 0
VOMITING: 0
DIZZINESS: 0
SINUS PAIN: 0
WHEEZING: 0
HEADACHES: 0
FATIGUE: 1
WEAKNESS: 0
EYE REDNESS: 0
BRUISES/BLEEDS EASILY: 1
FEVER: 0
SORE THROAT: 0
EYE ITCHING: 0
SLEEP DISTURBANCE: 1
APPETITE CHANGE: 0
ARTHRALGIAS: 1
ABDOMINAL PAIN: 0
CHEST TIGHTNESS: 1
SINUS PRESSURE: 0

## 2024-07-19 ASSESSMENT — PATIENT HEALTH QUESTIONNAIRE - PHQ9
10. IF YOU CHECKED OFF ANY PROBLEMS, HOW DIFFICULT HAVE THESE PROBLEMS MADE IT FOR YOU TO DO YOUR WORK, TAKE CARE OF THINGS AT HOME, OR GET ALONG WITH OTHER PEOPLE: SOMEWHAT DIFFICULT
SUM OF ALL RESPONSES TO PHQ9 QUESTIONS 1 & 2: 2
1. LITTLE INTEREST OR PLEASURE IN DOING THINGS: SEVERAL DAYS
2. FEELING DOWN, DEPRESSED OR HOPELESS: SEVERAL DAYS

## 2024-07-19 ASSESSMENT — LIFESTYLE VARIABLES
HOW MANY STANDARD DRINKS CONTAINING ALCOHOL DO YOU HAVE ON A TYPICAL DAY: 1 OR 2
AUDIT-C TOTAL SCORE: 1
HOW OFTEN DO YOU HAVE A DRINK CONTAINING ALCOHOL: MONTHLY OR LESS
SKIP TO QUESTIONS 9-10: 1
HOW OFTEN DO YOU HAVE SIX OR MORE DRINKS ON ONE OCCASION: NEVER

## 2024-07-22 ENCOUNTER — APPOINTMENT (OUTPATIENT)
Dept: GASTROENTEROLOGY | Facility: HOSPITAL | Age: 64
End: 2024-07-22
Payer: MEDICARE

## 2024-08-13 ENCOUNTER — TELEPHONE (OUTPATIENT)
Dept: PREADMISSION TESTING | Facility: HOSPITAL | Age: 64
End: 2024-08-13
Payer: MEDICARE

## 2024-08-13 DIAGNOSIS — R59.0 MEDIASTINAL ADENOPATHY: ICD-10-CM

## 2024-08-14 ENCOUNTER — PRE-ADMISSION TESTING (OUTPATIENT)
Dept: PREADMISSION TESTING | Facility: HOSPITAL | Age: 64
End: 2024-08-14
Payer: MEDICARE

## 2024-08-14 ENCOUNTER — DOCUMENTATION (OUTPATIENT)
Dept: PREADMISSION TESTING | Facility: HOSPITAL | Age: 64
End: 2024-08-14

## 2024-08-14 ENCOUNTER — LAB (OUTPATIENT)
Dept: LAB | Facility: LAB | Age: 64
End: 2024-08-14
Payer: MEDICARE

## 2024-08-14 VITALS
DIASTOLIC BLOOD PRESSURE: 75 MMHG | RESPIRATION RATE: 16 BRPM | HEIGHT: 69 IN | OXYGEN SATURATION: 95 % | BODY MASS INDEX: 30.66 KG/M2 | TEMPERATURE: 97.2 F | WEIGHT: 207.01 LBS | HEART RATE: 60 BPM | SYSTOLIC BLOOD PRESSURE: 119 MMHG

## 2024-08-14 DIAGNOSIS — Z01.818 PRE-OP TESTING: Primary | ICD-10-CM

## 2024-08-14 DIAGNOSIS — Z01.818 PRE-OP TESTING: ICD-10-CM

## 2024-08-14 LAB
ANION GAP SERPL CALC-SCNC: 12 MMOL/L (ref 10–20)
BASOPHILS # BLD AUTO: 0.06 X10*3/UL (ref 0–0.1)
BASOPHILS NFR BLD AUTO: 0.9 %
BUN SERPL-MCNC: 11 MG/DL (ref 6–23)
CALCIUM SERPL-MCNC: 9 MG/DL (ref 8.6–10.3)
CHLORIDE SERPL-SCNC: 97 MMOL/L (ref 98–107)
CO2 SERPL-SCNC: 28 MMOL/L (ref 21–32)
CREAT SERPL-MCNC: 1 MG/DL (ref 0.5–1.3)
EGFRCR SERPLBLD CKD-EPI 2021: 84 ML/MIN/1.73M*2
EOSINOPHIL # BLD AUTO: 0.25 X10*3/UL (ref 0–0.7)
EOSINOPHIL NFR BLD AUTO: 3.7 %
ERYTHROCYTE [DISTWIDTH] IN BLOOD BY AUTOMATED COUNT: 14.6 % (ref 11.5–14.5)
GLUCOSE SERPL-MCNC: 76 MG/DL (ref 74–99)
HCT VFR BLD AUTO: 41.5 % (ref 41–52)
HGB BLD-MCNC: 13.9 G/DL (ref 13.5–17.5)
IMM GRANULOCYTES # BLD AUTO: 0.02 X10*3/UL (ref 0–0.7)
IMM GRANULOCYTES NFR BLD AUTO: 0.3 % (ref 0–0.9)
LYMPHOCYTES # BLD AUTO: 1.48 X10*3/UL (ref 1.2–4.8)
LYMPHOCYTES NFR BLD AUTO: 22 %
MCH RBC QN AUTO: 28.8 PG (ref 26–34)
MCHC RBC AUTO-ENTMCNC: 33.5 G/DL (ref 32–36)
MCV RBC AUTO: 86 FL (ref 80–100)
MONOCYTES # BLD AUTO: 0.71 X10*3/UL (ref 0.1–1)
MONOCYTES NFR BLD AUTO: 10.5 %
NEUTROPHILS # BLD AUTO: 4.22 X10*3/UL (ref 1.2–7.7)
NEUTROPHILS NFR BLD AUTO: 62.6 %
NRBC BLD-RTO: 0 /100 WBCS (ref 0–0)
PLATELET # BLD AUTO: 234 X10*3/UL (ref 150–450)
POTASSIUM SERPL-SCNC: 3.8 MMOL/L (ref 3.5–5.3)
RBC # BLD AUTO: 4.82 X10*6/UL (ref 4.5–5.9)
SODIUM SERPL-SCNC: 133 MMOL/L (ref 136–145)
WBC # BLD AUTO: 6.7 X10*3/UL (ref 4.4–11.3)

## 2024-08-14 PROCEDURE — 85025 COMPLETE CBC W/AUTO DIFF WBC: CPT

## 2024-08-14 PROCEDURE — 93010 ELECTROCARDIOGRAM REPORT: CPT | Performed by: INTERNAL MEDICINE

## 2024-08-14 PROCEDURE — 93005 ELECTROCARDIOGRAM TRACING: CPT | Performed by: NURSE PRACTITIONER

## 2024-08-14 PROCEDURE — 36415 COLL VENOUS BLD VENIPUNCTURE: CPT

## 2024-08-14 PROCEDURE — 80048 BASIC METABOLIC PNL TOTAL CA: CPT

## 2024-08-14 PROCEDURE — 99204 OFFICE O/P NEW MOD 45 MIN: CPT | Performed by: NURSE PRACTITIONER

## 2024-08-14 RX ORDER — TRIAMCINOLONE ACETONIDE 1 MG/G
CREAM TOPICAL AS NEEDED
COMMUNITY

## 2024-08-14 ASSESSMENT — ENCOUNTER SYMPTOMS
ARTHRALGIAS: 1
BRUISES/BLEEDS EASILY: 1
DYSPNEA AT REST: 1
NECK STIFFNESS: 1
CONSTIPATION: 0
ABDOMINAL PAIN: 0
PALPITATIONS: 0
NEUROLOGICAL NEGATIVE: 1
CONSTITUTIONAL NEGATIVE: 1
DIARRHEA: 0
DYSPNEA WITH EXERTION: 1
MYALGIAS: 1
ENDOCRINE NEGATIVE: 1

## 2024-08-14 NOTE — PREPROCEDURE INSTRUCTIONS
Medication List            Accurate as of August 14, 2024  4:28 PM. Always use your most recent med list.                albuterol 90 mcg/actuation inhaler  Notes to patient: May use on day of surgery if needed     citalopram 40 mg tablet  Commonly known as: CeleXA  Medication Adjustments for Surgery: Take morning of surgery with sip of water, no other fluids     clopidogrel 75 mg tablet  Commonly known as: Plavix  Medication Adjustments for Surgery: Other (Comment)  Notes to patient: Stop 5 days prior to surgery     enalapril 20 mg tablet  Commonly known as: Vasotec  Notes to patient: Hold any evening dose the night before the day of surgery. Hold the day of surgery     famotidine 40 mg tablet  Commonly known as: Pepcid  Medication Adjustments for Surgery: Take morning of surgery with sip of water, no other fluids     hydroCHLOROthiazide 25 mg tablet  Commonly known as: HYDRODiuril  Medication Adjustments for Surgery: Take morning of surgery with sip of water, no other fluids     isosorbide dinitrate 10 mg tablet  Commonly known as: Isordil  Medication Adjustments for Surgery: Take morning of surgery with sip of water, no other fluids     metoprolol succinate  mg 24 hr tablet  Commonly known as: Toprol-XL  Medication Adjustments for Surgery: Take morning of surgery with sip of water, no other fluids     nitroglycerin 0.4 mg SL tablet  Commonly known as: Nitrostat  Notes to patient: May take on day of surgery if needed     oxyCODONE 10 mg immediate release tablet  Commonly known as: Roxicodone  Notes to patient: May take on day of surgery if needed     rosuvastatin 20 mg tablet  Commonly known as: Crestor  Medication Adjustments for Surgery: Take morning of surgery with sip of water, no other fluids     Symbicort 160-4.5 mcg/actuation inhaler  Generic drug: budesonide-formoteroL  Notes to patient: Ok to use on day of surgery     tetrahydrozoline 0.05 % ophthalmic solution  Notes to patient: May use on day  of surgery     triamcinolone 0.1 % cream  Commonly known as: Kenalog  Medication Adjustments for Surgery: Continue until night before surgery                     **Concerning above medication instructions, if medication is normally taken at night, continue normal schedule.**  **DO NOT TAKE NIGHT PRIOR AND MORNING OF SURGERY**    CONTACT SURGEON'S OFFICE IF YOU DEVELOP:  * Fever = 100.4 F   * New respiratory symptoms (e.g. cough, shortness of breath, respiratory distress, sore throat)  * Recent loss of taste or smell  *Flu like symptoms such as headache, fatigue or gastrointestinal symptoms  * You develop any open sores, shingles, burning or painful urination   AND/OR:  * You no longer wish to have the surgery.  * Any other personal circumstances change that may lead to the need to cancel or defer this surgery.  *You were admitted to any hospital within one week of your planned procedure.    SMOKING:  *Quitting smoking can make a huge difference to your health and recovery from surgery.    *If you need help with quitting, call 3-399-QUIT-NOW.    THE DAY BEFORE SURGERY:  *Do not eat any food after midnight the night before surgery.   *You are permitted to drink clear liquids (i.e. water, black coffee (no milk or cream), tea, apple juice and electrolyte drinks (gatorade)) up to 13.5 ounces, up to 2 hours before your arrival time.  *You may chew gum until 2 hours before your surgery    SURGICAL TIME  *You will be contacted between 2 p.m. and 6 p.m. the business day before your surgery with your arrival time.  *If you haven't received a call by 6pm, call [phone].  *Scheduled surgery times may change and you will be notified if this occurs-check your personal voicemail for any updates.    ON THE MORNING OF SURGERY:  *Wear comfortable, loose fitting clothing.   *Do not use moisturizers, creams, lotions or perfume.  *All jewelry and valuables should be left at home.  *Prosthetic devices such as contact lenses, hearing  aids, dentures, eyelash extensions, hairpins and body piercing must be removed before surgery.    BRING WITH YOU:  *Photo ID and insurance card  *Current list of medicines and allergies  *Pacemaker/Defibrillator/Heart stent cards  *CPAP machine and mask  *Slings/splints/crutches  *Copy of your complete Advanced Directive/DHPOA-if applicable  *Neurostimulator implant remote    PARKING AND ARRIVAL:  *Check in at the Main Entrance desk and let them know you are here for surgery.  *You will be directed to the 2nd floor surgical waiting area.    AFTER OUTPATIENT SURGERY:  *A responsible adult MUST accompany you at the time of discharge and stay with you for 24 hours after your surgery.  *You may NOT drive yourself home after surgery.  *You may use a taxi or ride sharing service ([employer], [employer]) to return home ONLY if you are accompanied by a friend or family member.  *Instructions for resuming your medications will be provided by your surgeon.

## 2024-08-14 NOTE — CPM/PAT H&P
[facility]/PAT Evaluation       Name: Sam Patterson (Sam Patterson)  /Age: 1960/63 y.o.         Date of Consult: 24    Referring Provider: Dr. Wood    Surgery, Date, and Length: Bronchoscopy 24    Sam Patterson is a 63 year-old male who presents to the [facility] for perioperative risk assessment prior to surgery.    Current pulmonary patient of Dr. Asad Muse; referred to the  interventional pulmonary team with concern for mediastinal adenopathy. Most recent PET CT from 6/10/24 showing mildly increased hypermetabolic activity within enlarged thoracic lymph nodes upon comparison from 23 PET CT. Patient had previously underwent bronchoscopy with our team last year on 23 in which pathology and findings were benign/inconclusive. Patient was referred back by his pulmonologist for further assessment and tissue biopsy. He also does have a notable hx of colon CA.    patient reports no SOB at rest but does have MANN; worse when walking up stairs or carrying in items in to the house. Takes Symbicort 160 daily. Will use Albuterol HFA usually 1-2x a week. Reports cough. Intermittent productive; yellow. A few weeks ago, was having some hemoptysis (mostly streaks of blood); but has since resolved. Hx of CABG x4 () and PCI (). He takes Plavix daily (last dose taken this morning). Intermittent orthopnea. No lower leg swelling. Has GERD but controlled on Pepcid. Intermittent chest tightness; last took nitroglycerin 1 week ago. Sees cardiology.      Patient presents with a primary diagnosis of mediastinal adenopathy.     This note was created in part upon personal review of patient's medical records.      Patient is scheduled to have a bronchoscopy.    Pt denies any past history of anesthetic complications such as PONV, awareness, prolonged sedation, dental damage, aspiration, cardiac arrest, difficult intubation, difficult I.V. access or unexpected hospital admissions.  NO malignant  hyperthermia and or pseudocholinesterase deficiency.  No history of blood transfusions     The patient is not a [employer] and will accept blood and blood products if medically indicated.       Past Medical History:   Diagnosis Date    Anticoagulated     Plavix    Cardiology follow-up encounter     Dr. Estephanie Corrales (ANDRY requested with preop cardiac clearance)    Chest tightness     Intermittent chest tightness; nitroglycerin PRN; Sees cardiology    Colon cancer (Multi) 2008    s/p colectomy and adjuvant chemo    COPD (chronic obstructive pulmonary disease) (Multi)     Depression     Emphysema lung (Multi)     Former smoker     GERD (gastroesophageal reflux disease)     H/O four vessel coronary artery bypass graft 2002    H/O heart artery stent 2016    History of bronchoscopy 07/27/2023    pathology and findings were benign/inconclusive. Patient was referred back by his pulmonologist for further assessment and tissue biopsy. He also does have a notable hx of colon CA.    History of orthopnea     Intermittent orthopnea    Hyperlipidemia     Hypertension     Lung nodules     Mediastinal adenopathy     Most recent PET CT from 6/10/24 showing mildly increased hypermetabolic activity within enlarged thoracic lymph nodes upon comparison from 6/26/23 PET CT    MI (myocardial infarction) (Multi)     Sleep apnea     severe KARI; split night study on 1/29/24. Wears CPAP intermittently.       Past Surgical History:   Procedure Laterality Date    BRONCHOSCOPY  07/27/2023    COLECTOMY  2008    CORONARY ANGIOPLASTY WITH STENT PLACEMENT  2016    CORONARY ARTERY BYPASS GRAFT  2002       Family History   Problem Relation Name Age of Onset    Ovarian cancer Mother      Brain cancer Father      Breast cancer Sister      Breast cancer Sister       Social History     Tobacco Use    Smoking status: Every Day     Current packs/day: 2.50     Average packs/day: 2.5 packs/day for 51.6 years (129.0 ttl pk-yrs)     Types: Cigarettes      Start date: 1973     Passive exposure: Past (Dad smoked)    Smokeless tobacco: Never    Tobacco comments:     Quit once for a year from 06/2023-06/2024. Recently started smoking again, less than one pack daily.   Vaping Use    Vaping status: Never Used   Substance Use Topics    Alcohol use: Yes     Comment: 2-3 a year    Drug use: Yes     Types: Marijuana     Comment: Smokes a joint a week       No Known Allergies      Current Outpatient Medications:     albuterol 90 mcg/actuation inhaler, Inhale 2 puffs every 4 hours if needed., Disp: , Rfl:     citalopram (CeleXA) 40 mg tablet, Take 1 tablet (40 mg) by mouth once daily., Disp: , Rfl:     enalapril (Vasotec) 20 mg tablet, Take 1 tablet (20 mg) by mouth once daily., Disp: , Rfl:     hydroCHLOROthiazide (HYDRODiuril) 25 mg tablet, Take 1 tablet (25 mg) by mouth once daily., Disp: , Rfl:     isosorbide dinitrate (Isordil) 10 mg tablet, Take 1 tablet (10 mg) by mouth 2 times a day., Disp: , Rfl:     metoprolol succinate XL (Toprol-XL) 100 mg 24 hr tablet, Take 1 tablet (100 mg) by mouth 2 times a day., Disp: , Rfl:     nitroglycerin (Nitrostat) 0.4 mg SL tablet, Place 1 tablet (0.4 mg) under the tongue every 5 minutes if needed for chest pain., Disp: , Rfl:     oxyCODONE (Roxicodone) 10 mg immediate release tablet, Take 1 tablet (10 mg) by mouth every 8 hours if needed for moderate pain (4 - 6) or severe pain (7 - 10)., Disp: , Rfl:     rosuvastatin (Crestor) 20 mg tablet, Take 1 tablet (20 mg) by mouth once daily., Disp: , Rfl:     Symbicort 160-4.5 mcg/actuation inhaler, Inhale 2 puffs 2 times a day. Rinse mouth after use, Disp: , Rfl:     tetrahydrozoline 0.05 % ophthalmic solution, 1 drop if needed for irritation., Disp: , Rfl:     clopidogrel (Plavix) 75 mg tablet, Take 1 tablet (75 mg) by mouth once daily., Disp: , Rfl:     famotidine (Pepcid) 40 mg tablet, Take 1 tablet (40 mg) by mouth early in the morning.., Disp: , Rfl:     triamcinolone (Kenalog) 0.1 %  "cream, Apply topically if needed., Disp: , Rfl:       PAT ROS:   Constitutional:   neg    Neuro/Psych:   neg    Eyes:    use of corrective lenses  Ears:    no hearing aides  Nose:   Mouth:    No teeth  Throat:   neg    Neck:    Limited rom of motion of neck   neck stiffness  Cardio:    Shortness of breath with activity. Able to do activities of daily living. Able to walk from the parking lot to [facility] without difficulty or SOB  Functional 3 Mets. Patient denies SOB walking up 1 flight of stairs    no chest pain   no palpitations   dyspnea   MANN  Respiratory:    Sleep apnea  Endocrine:   neg    GI:    no abdominal pain   no constipation   no diarrhea  :   neg    Musculoskeletal:    Shoulder and neck pain   arthralgias   myalgias  Hematologic:    bruises/bleeds easily (on plavix)   no history of blood transfusion   no blood clots  Skin:  neg        Physical Exam  Physical exam within normal limits.   Neck:      Comments: Limited rom of motion of neck  Pulmonary:      Effort: Pulmonary effort is normal.      Comments: Diminished breath sounds in bilateral bases         PAT AIRWAY:   Airway:     Mallampati::  III    Neck ROM::  Limited   No teeth      Visit Vitals  /75   Pulse 60   Temp 36.2 °C (97.2 °F)   Resp 16   Ht 1.753 m (5' 9\")   Wt 93.9 kg (207 lb 0.2 oz)   SpO2 95%   BMI 30.57 kg/m²   Smoking Status Former   BSA 2.14 m²      Plan    Cardiovascular:  Patient denies any chest pain, heaviness, pressure, radiating pain, palpitations, irregular heartbeats, lightheadedness, cough, congestion, PND, near syncope, weight loss or gain.  Patient notes dyspnea with exertion. Takes nitroglycerin intermittently with chest tightness. Last dose was within the past month.    2016- Stent placed  2002- CABG X 4  Plavix-patient to stop 5 days prior to surgery  Enalapril-patient to hold any evening dose the night before the day of surgery. Patient to hold on the day of surgery.   Hydrochlorothiazide-patient to take on " dos  Isosorbide dinitrate-patient to take on dos    HTN:  Metoprolol succinate XL-Patient to take on dos    Nitroglycerin-patient to take on dos if needed    HLD:  Crestor-patient to take on dos    EKG in PAT on 08/14/24  Sinus bradycardia @ 59 bpm  Non Specific Intraventricular block  Abnormal EKG      RCRI: 1 Risk of Mace: 6.0%    Cardiac risk stratification and Plavix stop instructions received on 08/15/24 and scanned into chart.     Pulm:  Known and treated  KARI- Patient was instructed to bring CPAP machine the morning of surgery. Recommend prioritizing  nonopioid analgesic techniques (regional and local anesthesia, nonsteroidal medications, etc) before the administration of opioids and close monitoring for hypoventilation after surgery due to KARI. Increased vigilance is recommended with the use of narcotics due to an increased risk for opioid induced respiratory depression  Wears CPAP intermittently    COPD  Emphysema  Albuterol-patient to take on dos if needed  Symbicort-patient to take on dos    GI/:  GERD-Famotidine-patient to take on dos    Heme:  Patient instructed to ambulate as soon as possible postoperatively to decrease thromboembolic risk.    Initiate mechanical DVT prophylaxis as soon as possible and initiate chemical prophylaxis when deemed safe from a bleeding standpoint post surgery.    Caprini=9    Risk assessment complete.  Patient is scheduled for an intermediate surgical risk procedure.     Labs/testing obtained in PAT on 08/14/24  EKG, CBC, BMP  Lab Results   Component Value Date    WBC 6.7 08/14/2024    HGB 13.9 08/14/2024    HCT 41.5 08/14/2024    MCV 86 08/14/2024     08/14/2024     Lab Results   Component Value Date    GLUCOSE 76 08/14/2024    CALCIUM 9.0 08/14/2024     (L) 08/14/2024    K 3.8 08/14/2024    CO2 28 08/14/2024    CL 97 (L) 08/14/2024    BUN 11 08/14/2024    CREATININE 1.00 08/14/2024     Labs reviewed, unremarkable.       Preoperative medication instructions  were provided and reviewed with the patient.  Any additional testing or evaluation was explained to the patient.  Nothing by mouth instructions were discussed and patient's questions were answered prior to conclusion to this encounter.  Patient verbalized understanding of preoperative instructions given in preadmission testing; discharge instructions available in EMR.    This note was dictated with speech recognition.  Minor errors may have been detected during use of speech recognition.

## 2024-08-14 NOTE — CPM/PAT NURSE NOTE
CPM/PAT Nurse Note      Name: Sam Patterson (Sam Patterson)  /Age: 1960/63 y.o.       Past Medical History:   Diagnosis Date    Anticoagulated     Plavix    Cardiology follow-up encounter     Dr. Estephanie Corrales (ANDRY requested with preop cardiac clearance)    Chest tightness     Intermittent chest tightness; nitroglycerin PRN; Sees cardiology    Colon cancer (Multi) 2008    s/p colectomy and adjuvant chemo    COPD (chronic obstructive pulmonary disease) (Multi)     Depression     Emphysema lung (Multi)     Former smoker     GERD (gastroesophageal reflux disease)     H/O four vessel coronary artery bypass graft     H/O heart artery stent     History of bronchoscopy 2023    pathology and findings were benign/inconclusive. Patient was referred back by his pulmonologist for further assessment and tissue biopsy. He also does have a notable hx of colon CA.    History of orthopnea     Intermittent orthopnea    Hyperlipidemia     Hypertension     Lung nodules     Mediastinal adenopathy     Most recent PET CT from 6/10/24 showing mildly increased hypermetabolic activity within enlarged thoracic lymph nodes upon comparison from 23 PET CT    MI (myocardial infarction) (Multi)     Sleep apnea     severe KARI; split night study on 24. Wears CPAP intermittently.       Past Surgical History:   Procedure Laterality Date    BRONCHOSCOPY  2023    COLECTOMY  2008    CORONARY ANGIOPLASTY WITH STENT PLACEMENT  2016    CORONARY ARTERY BYPASS GRAFT         Patient Sexual activity questions deferred to the physician.    Family History   Problem Relation Name Age of Onset    Ovarian cancer Mother      Brain cancer Father      Breast cancer Sister      Breast cancer Sister         No Known Allergies    Prior to Admission medications    Medication Sig Start Date End Date Taking? Authorizing Provider   albuterol 90 mcg/actuation inhaler Inhale 2 puffs every 4 hours if needed. 23   Historical  Provider, MD   citalopram (CeleXA) 40 mg tablet Take 1 tablet (40 mg) by mouth once daily.    Historical Provider, MD   clopidogrel (Plavix) 75 mg tablet Take 1 tablet (75 mg) by mouth once daily.    Historical Provider, MD   enalapril (Vasotec) 20 mg tablet Take 1 tablet (20 mg) by mouth once daily.    Historical Provider, MD   famotidine (Pepcid) 40 mg tablet Take 1 tablet (40 mg) by mouth early in the morning.. 10/14/23   Historical Provider, MD   hydroCHLOROthiazide (HYDRODiuril) 25 mg tablet Take 1 tablet (25 mg) by mouth once daily.    Historical Provider, MD   isosorbide dinitrate (Isordil) 10 mg tablet Take 1 tablet (10 mg) by mouth 2 times a day.    Historical Provider, MD   metoprolol succinate XL (Toprol-XL) 100 mg 24 hr tablet Take 1 tablet (100 mg) by mouth 2 times a day.    Historical Provider, MD   nitroglycerin (Nitrostat) 0.4 mg SL tablet Place 1 tablet (0.4 mg) under the tongue every 5 minutes if needed for chest pain.    Historical Provider, MD   oxyCODONE (Roxicodone) 10 mg immediate release tablet Take 1 tablet (10 mg) by mouth every 8 hours if needed for moderate pain (4 - 6) or severe pain (7 - 10).    Historical Provider, MD   rosuvastatin (Crestor) 20 mg tablet Take 1 tablet (20 mg) by mouth once daily.    Historical Provider, MD   Symbicort 160-4.5 mcg/actuation inhaler Inhale 2 puffs 2 times a day. Rinse mouth after use    Historical Provider, MD   tetrahydrozoline 0.05 % ophthalmic solution 1 drop if needed for irritation.    Historical Provider, MD   triamcinolone (Kenalog) 0.1 % cream Apply topically if needed.    Historical Provider, MD   allopurinol (Zyloprim) 300 mg tablet Take 1 tablet (300 mg) by mouth once daily.  8/14/24  Historical ProviderMD ROSA     DASI Risk Score    No data to display       Caprini DVT Assessment    No data to display       Modified Frailty Index    No data to display       CHADS2 Stroke Risk  Current as of a minute ago        N/A 3 to 100%: High  Risk   2 to < 3%: Medium Risk   0 to < 2%: Low Risk     Last Change: N/A          This score determines the patient's risk of having a stroke if the patient has atrial fibrillation.        This score is not applicable to this patient. Components are not calculated.          Revised Cardiac Risk Index    No data to display       Apfel Simplified Score    No data to display       Risk Analysis Index Results This Encounter    No data found in the last 1 encounters.         Nurse Plan of Action:     After Visit Summary (AVS) reviewed and patient verbalized good understanding of medications and NPO instructions.

## 2024-08-14 NOTE — PREPROCEDURE INSTRUCTIONS
Medication List            Accurate as of August 14, 2024  3:50 PM. Always use your most recent med list.                albuterol 90 mcg/actuation inhaler  Notes to patient: May use on day of surgery if needed     citalopram 40 mg tablet  Commonly known as: CeleXA  Medication Adjustments for Surgery: Take morning of surgery with sip of water, no other fluids     clopidogrel 75 mg tablet  Commonly known as: Plavix  Medication Adjustments for Surgery: Other (Comment)  Notes to patient: Stop 5 days prior to surgery     enalapril 20 mg tablet  Commonly known as: Vasotec  Notes to patient: Hold any evening dose the night before the day of surgery. Hold the day of surgery     famotidine 40 mg tablet  Commonly known as: Pepcid  Medication Adjustments for Surgery: Take morning of surgery with sip of water, no other fluids     hydroCHLOROthiazide 25 mg tablet  Commonly known as: HYDRODiuril  Medication Adjustments for Surgery: Take morning of surgery with sip of water, no other fluids     isosorbide dinitrate 10 mg tablet  Commonly known as: Isordil  Medication Adjustments for Surgery: Take morning of surgery with sip of water, no other fluids     metoprolol succinate  mg 24 hr tablet  Commonly known as: Toprol-XL  Medication Adjustments for Surgery: Take morning of surgery with sip of water, no other fluids     nitroglycerin 0.4 mg SL tablet  Commonly known as: Nitrostat  Notes to patient: May take on day of surgery if needed     oxyCODONE 10 mg immediate release tablet  Commonly known as: Roxicodone  Notes to patient: May take on day of surgery if needed     rosuvastatin 20 mg tablet  Commonly known as: Crestor  Medication Adjustments for Surgery: Take morning of surgery with sip of water, no other fluids     Symbicort 160-4.5 mcg/actuation inhaler  Generic drug: budesonide-formoteroL  Notes to patient: Ok to use on day of surgery     tetrahydrozoline 0.05 % ophthalmic solution  Notes to patient: May use on day  of surgery     triamcinolone 0.1 % cream  Commonly known as: Kenalog  Medication Adjustments for Surgery: Continue until night before surgery                        **Concerning above medication instructions, if medication is normally taken at night, continue normal schedule.**  **DO NOT TAKE NIGHT PRIOR AND MORNING OF SURGERY**    CONTACT SURGEON'S OFFICE IF YOU DEVELOP:  * Fever = 100.4 F   * New respiratory symptoms (e.g. cough, shortness of breath, respiratory distress, sore throat)  * Recent loss of taste or smell  *Flu like symptoms such as headache, fatigue or gastrointestinal symptoms  * You develop any open sores, shingles, burning or painful urination   AND/OR:  * You no longer wish to have the surgery.  * Any other personal circumstances change that may lead to the need to cancel or defer this surgery.  *You were admitted to any hospital within one week of your planned procedure.    SMOKING:  *Quitting smoking can make a huge difference to your health and recovery from surgery.    *If you need help with quitting, call 1-896-QUIT-NOW.    THE DAY BEFORE SURGERY:  *Do not eat any food after midnight the night before surgery.   *You are permitted to drink clear liquids (i.e. water, black coffee (no milk or cream), tea, apple juice and electrolyte drinks (gatorade)) up to 13.5 ounces, up to 2 hours before your arrival time.  *You may chew gum until 2 hours before your surgery    SURGICAL TIME  *You will be contacted between 2 p.m. and 6 p.m. the business day before your surgery with your arrival time.  *If you haven't received a call by 6pm, call [phone].  *Scheduled surgery times may change and you will be notified if this occurs-check your personal voicemail for any updates.    ON THE MORNING OF SURGERY:  *Wear comfortable, loose fitting clothing.   *Do not use moisturizers, creams, lotions or perfume.  *All jewelry and valuables should be left at home.  *Prosthetic devices such as contact lenses,  hearing aids, dentures, eyelash extensions, hairpins and body piercing must be removed before surgery.    BRING WITH YOU:  *Photo ID and insurance card  *Current list of medicines and allergies  *Pacemaker/Defibrillator/Heart stent cards  *CPAP machine and mask  *Slings/splints/crutches  *Copy of your complete Advanced Directive/DHPOA-if applicable  *Neurostimulator implant remote    PARKING AND ARRIVAL:  *Check in at the Main Entrance desk and let them know you are here for surgery.  *You will be directed to the 2nd floor surgical waiting area.    AFTER OUTPATIENT SURGERY:  *A responsible adult MUST accompany you at the time of discharge and stay with you for 24 hours after your surgery.  *You may NOT drive yourself home after surgery.  *You may use a taxi or ride sharing service ([employer], [employer]) to return home ONLY if you are accompanied by a friend or family member.  *Instructions for resuming your medications will be provided by your surgeon.         Patient Information: Oral/Dental Rinse  **This is a prescription; pick it up at your preferred local pharmacy **  What is oral/dental rinse?   It is a mouthwash. It is a way of cleaning the mouth with a germ killing solution before your surgery.  The solution contains chlorhexidine, commonly known as CHG.   It is used inside the mouth to kill a bacteria known as Staphylococcus aureus.  Let your doctor know if you are allergic to Chlorhexidine.    Why do I need to use CHG oral/dental rinse?  The CHG oral/dental rinse helps to kill a bacteria in your mouth known a Staphylococcus aureus.     This reduces the risk of infection at the surgical site.      Using your CHG oral/dental rinse  STEPS:  Use your CHG oral/dental rinse after you brush your teeth the night before (at bedtime) and the morning of your surgery.  Follow all directions on your prescription label.    Use the cap on the container to measure 15ml (fill cap to fill line)  Swish (gargle if you can) the mouthwash  in your mouth for at least 30 seconds, (do not to swallow) spit out  After you use your CHG rinse, do not rinse your mouth with water, drink or eat.  Please refer to prescription label for the appropriate time to resume oral intake  Dental rinse comes in one size bottle: 473ml ~16oz.  You will have leftover    rinse, discard after this use.    What side effects might I have using the CHG oral/dental rinse?  CHG rinse will stick to plaque on the teeth.  Brush and floss just before use.  Teeth brushing will help avoid staining of plaque during use.    Who should I contact if I have questions about the CHG oral/dental rinse?  Please call [facility], Preadmission Testing at [phone] if you have any questions      Home Preoperative Antibacterial Shower     What is a home preoperative antibacterial shower?  This shower is a way of cleaning the skin with a germ killing soap before surgery.  The soap contains chlorhexidine, commonly known as CHG.  CHG is a soap for your skin with germ killing ability.  Let your doctor know if you are allergic to chlorhexidine.    Why do I need to take a preoperative antibacterial shower?  Skin is not sterile.  It is best to try to make your skin as free of germs as possible before surgery.  Proper cleansing with a germ killing soap before surgery can lower the number of germs on your skin.  This helps to reduce the risk of infection at the surgical site.  Following the instructions listed below will help you prepare your skin for surgery.      How do I use the CHG skin cleanser?  Steps:  Begin using your CHG soap five days before your scheduled surgery on ________________________.    Days 1-4 Shower before bed:  Wash your face and genitals with your normal soap and rinse.    Wash and rinse your hair using the CHG soap. Rinse completely, do not condition your   Hair.          3.    Apply the CHG soap to a clean wet washcloth.  Turn the water off or move  away                From the water spray to avoid premature rinsing of the CHG soap as you are applying.     4.   Lather your entire body from the neck down.  Do not use on your face or genitals.   Pay special attention to the area(s) where your incision(s) will be located unless they are on your face.  Avoid scrubbing your skin too hard.  The important point is to have the CHG soap sit on your skin for 3 minutes.    When the 3 minutes are up, turn on the water and rinse the CHG soap off your body completely.   Pat yourself dry with a clean, freshly-laundered towel.  Dress in clean, freshly laundered night clothes.    Be sure to sleep with clean, freshly laundered sheets.  Day 5:  Last shower is the morning before surgery: Follow above Instructions.    NOTE:    *Hair extensions should be removed    *Keep CHG soap out of eyes and ear canals   *DO NOT wash with regular soap on your body after you have used the CHG        soap solution  *DO NOT apply powders, lotions, or perfume.  *Deodorant may be used days 1-4, BUT NOT the day of surgery    Who should I contact if I have any questions regarding the use of CHG soap?  Call the [facility], Preadmission Testing at [phone] if you have any questions.              Patient Information: Pre-Operative Infection Prevention Measures     Why did I have my nose, under my arms and groin swabbed?  The purpose of the swab is to identify Staphylococcus aureus inside your nose or on your skin.  The swab was sent to the laboratory for culture.  A positive swab/culture for Staphylococcus aureus is called colonization or carriage.      What is Staphylococcus aureus?  Staphylococcus aureus, also known as “staph”, is a germ found on the skin or in the nose of healthy people.  Sometimes Staphylococcus aureus can get into the body and cause an infection.  This can be minor (such as pimples, boils or other skin problems).  It might also be serious (such as  blood infection, pneumonia or a surgical site infection).    What is Staphylococcus aureus colonization or carriage?  Colonization or carriage means that a person has the germ but is not sick from it.  These bacteria can be spread on the hands or when breathing or sneezing.    How is Staphylococcus aureus spread?  It is most often spread by close contact with a person or item that carries it.    What happens if my culture is positive for Staphylococcus aureus?  Your doctor/medical team will use this information to guide any antibiotic treatment which may be necessary.  Regardless of the culture results, we will clean the inside of your nose with a betadine swab just before you have your surgery.      Will I get an infection if I have Staphylococcus aureus in my nose or on my skin?  Anyone can get an infection with Staphylococcus aureus.  However, the best way to reduce your risk of infection is to follow the instructions provided to you for the use of your CHG soap and dental rinse.        Who should I contact if I have any questions?  Call the [facility], Preadmission Testing at [phone] if you have any questions.

## 2024-08-15 ENCOUNTER — TELEMEDICINE (OUTPATIENT)
Dept: PULMONOLOGY | Facility: HOSPITAL | Age: 64
End: 2024-08-15
Payer: MEDICARE

## 2024-08-15 DIAGNOSIS — J44.9 COPD, VERY SEVERE (MULTI): ICD-10-CM

## 2024-08-15 DIAGNOSIS — R59.0 MEDIASTINAL ADENOPATHY: Primary | ICD-10-CM

## 2024-08-15 DIAGNOSIS — F17.210 CIGARETTE SMOKER: ICD-10-CM

## 2024-08-15 DIAGNOSIS — G47.33 OBSTRUCTIVE SLEEP APNEA OF ADULT: ICD-10-CM

## 2024-08-15 LAB
ATRIAL RATE: 59 BPM
P AXIS: 23 DEGREES
P OFFSET: 170 MS
P ONSET: 114 MS
PR INTERVAL: 176 MS
Q ONSET: 202 MS
QRS COUNT: 10 BEATS
QRS DURATION: 130 MS
QT INTERVAL: 498 MS
QTC CALCULATION(BAZETT): 493 MS
QTC FREDERICIA: 495 MS
R AXIS: 77 DEGREES
T AXIS: 38 DEGREES
T OFFSET: 451 MS
VENTRICULAR RATE: 59 BPM

## 2024-08-15 PROCEDURE — 1159F MED LIST DOCD IN RCRD: CPT | Performed by: NURSE PRACTITIONER

## 2024-08-15 PROCEDURE — 99215 OFFICE O/P EST HI 40 MIN: CPT | Performed by: NURSE PRACTITIONER

## 2024-08-15 ASSESSMENT — PATIENT HEALTH QUESTIONNAIRE - PHQ9
2. FEELING DOWN, DEPRESSED OR HOPELESS: NOT AT ALL
1. LITTLE INTEREST OR PLEASURE IN DOING THINGS: NOT AT ALL
SUM OF ALL RESPONSES TO PHQ9 QUESTIONS 1 AND 2: 0

## 2024-08-15 ASSESSMENT — COLUMBIA-SUICIDE SEVERITY RATING SCALE - C-SSRS
6. HAVE YOU EVER DONE ANYTHING, STARTED TO DO ANYTHING, OR PREPARED TO DO ANYTHING TO END YOUR LIFE?: NO
2. HAVE YOU ACTUALLY HAD ANY THOUGHTS OF KILLING YOURSELF?: NO
1. IN THE PAST MONTH, HAVE YOU WISHED YOU WERE DEAD OR WISHED YOU COULD GO TO SLEEP AND NOT WAKE UP?: NO

## 2024-08-15 NOTE — PROGRESS NOTES
Patient: Sam Patterson    [id number]  : 1960 -- AGE 63 y.o.    Provider: SARAH Wilson-CNP     Location [facility]   Service Date: 8/15/2024       Department of Medicine  Division of Pulmonary, Critical Care, and Sleep Medicine       [facility]  Established Patient Visit Note    Virtual or Telephone Consent  A telephone visit (audio only) between the patient (at the originating site) and the provider (at the distant site) was utilized to provide this telehealth service.   Verbal consent was requested and obtained from Sam Patterson on this date, 08/15/24 for a telehealth visit.     HISTORY OF PRESENT ILLNESS       HISTORY OF PRESENT ILLNESS   Sam Patterson is a 63 y.o. male (current smoker, ~100 pack year history) with a PMHx of COPD, CAD, colon cancer, HTN, HLD, MI, depression. Scheduled with [facility] for an evaluation prior to upcoming bronchoscopy with biopsy for mediastinal lymphadenopathy.     I have independently interviewed the patient via telephone and reviewed available records.      DATE OF LAST VISIT: 24 telehealth pre-bronch with Juan J Beatty CNP     Care Team:  - Pulmonary: Dr. Asad Muse  - PCP: Terry Alejandro  - Cardiology: Dr. Hayden Corrales      Current History  Patient follows with Dr. Muse for pulmonary. Initially had bronch with biopsy 23 and findings were benign/inconclusive. Referred back to  for concern for mediastinal lymphadenopathy after PET CT 6/10/24 showed mildly increased hyperactivity of enlarged thoracic LNs compared to PET from 23.       On today's visit, the patient reports he has shortness of breath with minimal activity such as walking to his kitchen. He has a productive cough with clear sputum; denies yellow/brown sputum or hemoptysis. He denies wheezing. He has occasional chest pain/tightness, last used nitroglycerin about 1 month ago.    For  his COPD he uses Symbicort 2 puffs BID and albuterol PRN; is using albuterol about 1 time/week at most. He is no longer taking Spiriva.     Other symptoms:  Allergies: denies nasal congestion/PND  GERD: denies  Fever/chills: denies  Fatigue: has been more tired, napping frequently  Weakness: generalized weakness   Headaches: few headaches over the last couple months  Leg swelling:  denies   Orthopnea: denies     Patient reports about 3-4 weeks ago he started smoking again; smoking about 1 PPD. Had quit 2023 until 2024.     Denies recent visits to UC, ER, PCP for breathing. Denies recent antibiotics or steroids for breathing.     No pulmonary hospitalizations. Has never been on home oxygen therapy before.    Sleep: Has been diagnosed with sleep apnea. Has a CPAP machine that he uses some but not every night.       PMHx: COPD, CAD, colon cancer, HTN, HLD, MI, depression    Surg Hx:  CABG x 4, colon resection, neck surgery with hardware, cardiac stent     Social Hx:  -smoking:   -ETOH: denies  -illicit drugs: marijuana (1-1.5 joints/week)  -occupation: disability - prior , pressure washing  -exposures: + exposure to chemicals; Denies known exposures to asbestos, silica, beryllium, molds or dusts; denies farm exposure  - had birds in the past (for about 13 years); currently has dog & cats      Fam Hx:   Family History   Problem Relation Name Age of Onset    Ovarian cancer Mother      Brain cancer Father      Breast cancer Sister      Breast cancer Sister      Lung cancer Sister              Heart attack Brother           at age 40           Prior Notes & History     From Dr. Muse note from 24           REVIEW OF SYSTEMS     REVIEW OF SYSTEMS  Review of Systems  10-point ROS complete and negative except as documented in HPI    ALLERGIES AND MEDICATIONS     ALLERGIES  No Known Allergies    MEDICATIONS  Current Outpatient Medications   Medication Sig Dispense Refill    albuterol 90  mcg/actuation inhaler Inhale 2 puffs every 4 hours if needed.      citalopram (CeleXA) 40 mg tablet Take 1 tablet (40 mg) by mouth once daily.      clopidogrel (Plavix) 75 mg tablet Take 1 tablet (75 mg) by mouth once daily.      enalapril (Vasotec) 20 mg tablet Take 1 tablet (20 mg) by mouth once daily.      hydroCHLOROthiazide (HYDRODiuril) 25 mg tablet Take 1 tablet (25 mg) by mouth once daily.      isosorbide dinitrate (Isordil) 10 mg tablet Take 1 tablet (10 mg) by mouth 2 times a day.      metoprolol succinate XL (Toprol-XL) 100 mg 24 hr tablet Take 1 tablet (100 mg) by mouth 2 times a day.      nitroglycerin (Nitrostat) 0.4 mg SL tablet Place 1 tablet (0.4 mg) under the tongue every 5 minutes if needed for chest pain.      oxyCODONE (Roxicodone) 10 mg immediate release tablet Take 1 tablet (10 mg) by mouth every 8 hours if needed for moderate pain (4 - 6) or severe pain (7 - 10).      rosuvastatin (Crestor) 20 mg tablet Take 1 tablet (20 mg) by mouth once daily.      Symbicort 160-4.5 mcg/actuation inhaler Inhale 2 puffs 2 times a day. Rinse mouth after use      tetrahydrozoline 0.05 % ophthalmic solution 1 drop if needed for irritation.      triamcinolone (Kenalog) 0.1 % cream Apply topically if needed.      famotidine (Pepcid) 40 mg tablet Take 1 tablet (40 mg) by mouth early in the morning..       No current facility-administered medications for this visit.       PAST HISTORY     PAST MEDICAL HISTORY  He  has a past medical history of Anticoagulated, Cardiology follow-up encounter, Chest tightness, Colon cancer (Multi) (2008), COPD (chronic obstructive pulmonary disease) (Multi), Depression, Emphysema lung (Multi), Former smoker, GERD (gastroesophageal reflux disease), H/O four vessel coronary artery bypass graft (2002), H/O heart artery stent (2016), History of bronchoscopy (07/27/2023), History of orthopnea, Hyperlipidemia, Hypertension, Lung nodules, Mediastinal adenopathy, MI (myocardial infarction)  (Multi), and Sleep apnea.    PAST SURGICAL HISTORY  Past Surgical History:   Procedure Laterality Date    BRONCHOSCOPY  2023    COLECTOMY  2008    CORONARY ANGIOPLASTY WITH STENT PLACEMENT  2016    CORONARY ARTERY BYPASS GRAFT         IMMUNIZATION HISTORY  Immunization History   Administered Date(s) Administered    Flu vaccine (IIV4), preservative free *Check age/dose* 10/03/2018, 09/10/2020, 2021    Flu vaccine, quadrivalent, high-dose, preservative free, age 65y+ (FLUZONE) 2024    Flu vaccine, quadrivalent, no egg protein, age 6 month or greater (FLUCELVAX) 2022    Flu vaccine, trivalent, preservative free, age 6 months and greater (Fluarix/Fluzone/Flulaval) 2015, 10/18/2016    Influenza Whole 2014    Influenza, injectable, quadrivalent 10/03/2019    Moderna COVID-19 vaccine, bivalent, blue cap/gray label *Check age/dose* 2023    Moderna SARS-CoV-2 Vaccination 2021, 2021, 2021    Novel influenza-H1N1-09, preservative-free 2009    Pneumococcal conjugate vaccine, 13-valent (PREVNAR 13) 2015    Pneumococcal conjugate vaccine, 20-valent (PREVNAR 20) 2022    RSV, 60 Years And Older (AREXVY) 2023         PHYSICAL EXAM     VITAL SIGNS: There were no vitals taken for this visit.     PREVIOUS WEIGHTS:  Wt Readings from Last 3 Encounters:   24 93.9 kg (207 lb 0.2 oz)       Physical Exam  Deferred in setting of telehealth visit.    RESULTS/DATA     Pulmonary Function Test Results     No PFT on record    Chest Radiograph     No results found for this or any previous visit from the past 365 days.    PET CT      [facility] imagin/10/24 NM PET MR skull base to MID thigh  Impression    Mild interval increase in metabolic activity within enlarged thoracic lymph  nodes since 2023.    No new hypermetabolic foci are identified in the neck, chest chest abdomen or  pelvis  Narrative    EXAMINATION:  WHOLE BODY  PET/CT    6/10/2024    TECHNIQUE:  Following IV injection of 16.1 mCi of F-18 FDG, PET  tumor imaging was  acquired from the base of the skull to the mid thighs.  Computed tomography  was used for purposes of attenuation correction and anatomic localization.  Fusion imaging was utilized for interpretation.    Uptake time 61 min.  Glucose level 85 mg/dl.    COMPARISON:  PET-CT dated 06/26/2023.    HISTORY:  ORDERING SYSTEM PROVIDED HISTORY: Solitary pulmonary nodule  TECHNOLOGIST PROVIDED HISTORY:  What reading provider will be dictating this exam?->CRC    FINDINGS:  HEAD/NECK: There is no evidence of hypermetabolic activity within the image  portion of the intracranial space or scalp.  No hypermetabolic cervical lymph  node is identified.    CHEST: Hypermetabolic activity within nonenlarged right supraclavicular lymph  nodes have a maximal SUV of 5.1 as compared to 5.0 on the prior examination.    Hypermetabolism within right paratracheal the nodes has a maximal SUV of 9.2  as compared 8.2 on the prior examination.  Hypermetabolic activity within  subcarinal, right and left hilar lymph nodes have slightly increased with the  maximal SUV measuring 11.6, 10.3 and 6.5 respectively, as compared to 10.7,  7.7 and 6.1 on the prior examination.  These lymph nodes have not  significantly changed in size.  There is no evidence of new hypermetabolic  pulmonary parenchymal foci or thoracic lymph node enlargement.    ABDOMEN/PELVIS: There is no evidence of focal hypermetabolism within the  liver, spleen, pancreas or adrenals.  No definite focal hypermetabolic  activity is identified within bowel.  There is no evidence of hypermetabolic  abdominal or pelvic lymph nodes.    BONES/SOFT TISSUE: There is no evidence of osseous hypermetabolic foci.    INCIDENTAL CT FINDINGS: Left hydrocele is identified.  The gallbladder is  surgically absent    6/26/23 NM PET MR skull base to MID thigh  Impression    Hypermetabolic lymph nodes  throughout the mediastinum and at the jaci  bilaterally, and to a lesser extent within the cervical chains and inguinal  regions, for which lymphoma, chantale metastatic disease, or sarcoid are among  leading differential considerations.    Nonspecific diffuse activity is seen associated with right-sided airspace  disease and left pleural thickening, potentially inflammatory.  Continued  attention on CT follow-up is recommended.    RECOMMENDATIONS:  Unavailable  Narrative    EXAMINATION:  Skull base to midthigh PET/CT    6/26/2023    TECHNIQUE:  Following IV injection of 15 mCi of F-18 FDG, PET  tumor imaging was acquired  from the base of the skull to the mid thighs.  Computed tomography was used  for purposes of attenuation correction and anatomic localization.  Fusion  imaging was utilized for interpretation.    Uptake time 52 min.  Glucose level 95 mg/dl.    COMPARISON:  None    HISTORY:  ORDERING SYSTEM PROVIDED HISTORY: Solitary pulmonary nodule  TECHNOLOGIST PROVIDED HISTORY:  What reading provider will be dictating this exam?->CRC    Initial evaluation    FINDINGS:  HEAD/NECK: Activity is seen within shotty lymph nodes within bilateral  cervical chains.  Postoperative changes seen of the cervical spine.  Activity  at the vocal cords, likely physiologic.  Bilateral carotid artery  calcification.    CHEST: Hypermetabolic lymph nodes are seen within the superior mediastinum,  right paratracheal, pretracheal, precarinal, subcarinal, bilateral hilar  regions.  As an example, subcarinal lymph node measures approximately 2.8 cm  in short axis, SUV maximum 10.7.  No hypermetabolic axillary adenopathy.    Emphysema is present.  Partially calcified left pleural plaque is  demonstrated with diffuse intermediate level activity.  Heterogeneous and  ground-glass opacity within the peripheral right lower lobe in mid lung is  associated with diffuse intermediate level activity.    ABDOMEN/PELVIS: Excretion of activity is seen  from bilateral kidneys and  activity is seen within the urinary bladder.  Bowel activity is seen which  can be physiologically variable.    Activity is seen within nonenlarged lymph nodes within the right greater than  left inguinal regions.    Status post cholecystectomy.  Calcification of the abdominal aorta and iliac  arteries.  Calcification of prostate    Echocardiogram & Cardiac Studies     No Echocardiogram on file    EKG 8/14/24:   Sinus bradycardia  Nonspecific intraventricular block  Abnormal ECG  No previous ECGs available  Confirmed by Easton Aleman ([id number]) on 8/15/2024 8:32:02 AM       Labwork & Pathology   Complete Blood Count  Results from last 7 days   Lab Units 08/14/24  1632   WBC AUTO x10*3/uL 6.7   HEMOGLOBIN g/dL 13.9   HEMATOCRIT % 41.5   PLATELETS AUTO x10*3/uL 234   NEUTROS PCT AUTO % 62.6   LYMPHS PCT AUTO % 22.0   MONOS PCT AUTO % 10.5   EOS PCT AUTO % 3.7        Results from last 7 days   Lab Units 08/14/24  1632   SODIUM mmol/L 133*   POTASSIUM mmol/L 3.8   CHLORIDE mmol/L 97*   CO2 mmol/L 28   BUN mg/dL 11   CREATININE mg/dL 1.00   CALCIUM mg/dL 9.0   GLUCOSE mg/dL 76         Bronchoscopy & Pathology/Cultures     Respiratory Culture  Lab Results   Component Value Date    RESPCULTSM  07/27/2023       ~NORMAL THROAT ZE, CULTURE IN PROGRESS.~  2+ NORMAL THROAT ZE.    GRAMSTAIN NO GRANULOCYTES OR ORGANISMS SEEN. 07/27/2023       Fungal Culture  Lab Results   Component Value Date    FUNGALCULTSM Candida glabrata (A) 07/27/2023    FUNGALSMEAR FLUORESCENT FUNGAL STAIN: NEGATIVE 07/27/2023       AFB Culture  Lab Results   Component Value Date    AFBCX NO MYCOBACTERIA ISOLATED. 07/27/2023    AFBSTAIN ACID FAST SMEAR - NEGATIVE. 07/27/2023 7/27/23 Converted Surgical Pathology  FINAL DIAGNOSIS  A: LYMPH NODE, 11 RS, CORE BIOPSY:  --  LYMPH NODE WITH FOLLICULAR AND PARAFOLLICULAR HYPERPLASIA, FAVOR REACTIVE.  SEE NOTE.    : KRISTYN Tran reviewed the slides, flow  cytometry  studies and agrees with the diagnosis on 08/03/2023.  Dr. Shola Walker reviewed few immunostains and agreed with the  interpretation of negative for carcinoma.    NOTE: Per electronic medical records, patient's clinical note mentioned lung  nodules; imaging studies from outside hospital performed on 06/26/2023  demonstrated hypermetabolic lymph nodes in the mediastinum, bilateral hilar,  cervical chain and inguinal lymph nodes. Overall, the morphologic and  immunophenotypic findings showed a lymph node with follicular and  parafollicular hyperplasia, favoring a reactive process. Correlation with  pending B-cell rearrangement studies is recommended. If lymphadenopathy  persists or if there is a high clinical suspicion for lymphoproliferative  disorder exists, an excisional lymph node biopsy with flow cytometry studies is  recommended.      MORPHOLOGY: The histologic sections showed fragments of lymph node with  reactive appearing follicles and parafollicular T-cell areas.    IMMUNOHISTOCHEMISTRY:  Immunohistochemical stains performed on block A1 demonstrate the following  results:       CD20 and CD19: Highlights B-lymphocytes primarily in follicles.       CD3: Highlights T-lymphocytes primarily in the paracortex.       CD5: Highlights T-lymphocytes primarily in the paracortex.       CD10: Highlights germinal centers.       BCL6: Highlights germinal centers.       BCL2: Negative in germinal centers.       CD21 and CD23: Highlights intact follicular dendritic meshworks.       Ki67: Shows high proliferative index in reactive germinal centers.       CyclinD1: Negative.       NOE JORDON: Negative.       INSM1, TTF-1 and P40 are negative.         CD30: Highlights scattered cells.       C-MYC: Highlights scattered cells.       MUM1: Highlights plasma/plasmacytic cells.           FLOW CYTOMETRY: No clonal B-cell or abnormal T-cell population identified.  See  separate report for details.    CYTOGENETIC/MOLECULAR  STUDIES: B-cell rearrangement studies are pending and the  results will be reported as an addendum.    Immunostains were performed in addition to flow cytometry to full characterize  the phenotype, architecture, and extent of the atypical population(s).  This  was medically necessary for the best possible diagnosis.    The gross and/or microscopic findings were reviewed in conjunction with  pathology resident, Candace Dorantes MD.       ASSESSMENT/PLAN   Sam Patterson is a 63 y.o. male (current smoker, ~100 pack year history) with a PMHx of COPD, CAD, colon cancer, HTN, HLD, MI, depression. Scheduled with [facility] for an evaluation prior to upcoming bronchoscopy with biopsy for mediastinal lymphadenopathy.     Patient follows with Dr. Muse for pulmonary. Initially had bronch with biopsy 7/27/23 and findings were benign/inconclusive. Referred back to  for concern for mediastinal lymphadenopathy after PET CT 6/10/24 showed mildly increased hyperactivity of enlarged thoracic LNs compared to PET from 6/26/23.     Problem List and Orders  Problem List Items Addressed This Visit             ICD-10-CM    Mediastinal adenopathy - Primary R59.0    COPD, very severe (Multi) J44.9    Cigarette smoker F17.210    Obstructive sleep apnea of adult G47.33       Assessment and Plan / Recommendations:    1) Mediastinal LAD   - Plan for bronchoscopy with biopsy (Airway Examination, Diagnostic EBUS)  - Lab work prior to procedure  (completed 8/14/24)  - No pre-procedure COVID test needed as of 10/11/22    - Is on Plavix, last dose 8/15/24. Verbalized understanding that today 8/15/24 is the last day he should take the Plavix prior to his procedure as it needs held 5 days prior. (Cardiac clearance to hold Plavix for 5 days scanned in chart)  - Last use of nitroglycerin was about 1 month ago.     I explained the procedure to the patient. We discussed that the bronchoscopy will be performed by a  member of the Interventional Pulmonary Team; depending on scheduling and provider availability. We also discussed that the IP providers function as a team and not infrequently may have to fill in for one another if there are emergent issues that need attention at the same time. The patient / family expressed understanding and agreed to proceed. All questions were answered.      2) Current smoker - Patient reports about 3-4 weeks ago he started smoking again; smoking about 1 PPD. Had quit 06/2023 until 07/2024.   - Encouraged patient to stop smoking prior to his procedure because smoking increases risk of postoperative pulmonary complications; he verbalized understanding.   - Encouraged him to reach out to his pulmonologist if needs assistance stopping smoking again.     3) KARI on CPAP - patient is not using his CPAP regularly as prescribed  - encouarged him to use CPAP every night  - he also reports he was scheduled for a sleep study in the evening after his bronch/biopsy, I advised since he will be receiving general anesthesia the same day it is best to reschedule his sleep study    4) COPD - follows with Dr. Muse  - advised to continue use of Symbicort 2 puffs VID and albuterol PRN      Patient's visit was converted to a virtual visit.    Spoke with the patient via telephone for 25 minutes.      Prep: 15 minutes     Phone: 25 minutes     Total: 40 minutes       Will plan to follow with his pulmonologist, Dr. Muse, after procedure.

## 2024-08-15 NOTE — H&P (VIEW-ONLY)
Patient: Sam Patterson    [id number]  : 1960 -- AGE 63 y.o.    Provider: SARAH Wilson-CNP     Location [facility]   Service Date: 8/15/2024       Department of Medicine  Division of Pulmonary, Critical Care, and Sleep Medicine       [facility]  Established Patient Visit Note    Virtual or Telephone Consent  A telephone visit (audio only) between the patient (at the originating site) and the provider (at the distant site) was utilized to provide this telehealth service.   Verbal consent was requested and obtained from Sam Patterson on this date, 08/15/24 for a telehealth visit.     HISTORY OF PRESENT ILLNESS       HISTORY OF PRESENT ILLNESS   Sam Patterson is a 63 y.o. male (current smoker, ~100 pack year history) with a PMHx of COPD, CAD, colon cancer, HTN, HLD, MI, depression. Scheduled with [facility] for an evaluation prior to upcoming bronchoscopy with biopsy for mediastinal lymphadenopathy.     I have independently interviewed the patient via telephone and reviewed available records.      DATE OF LAST VISIT: 24 telehealth pre-bronch with Juan J Beatty CNP     Care Team:  - Pulmonary: Dr. Asad Muse  - PCP: Terry Alejandro  - Cardiology: Dr. Hayden Corrales      Current History  Patient follows with Dr. Muse for pulmonary. Initially had bronch with biopsy 23 and findings were benign/inconclusive. Referred back to  for concern for mediastinal lymphadenopathy after PET CT 6/10/24 showed mildly increased hyperactivity of enlarged thoracic LNs compared to PET from 23.       On today's visit, the patient reports he has shortness of breath with minimal activity such as walking to his kitchen. He has a productive cough with clear sputum; denies yellow/brown sputum or hemoptysis. He denies wheezing. He has occasional chest pain/tightness, last used nitroglycerin about 1 month ago.    For  his COPD he uses Symbicort 2 puffs BID and albuterol PRN; is using albuterol about 1 time/week at most. He is no longer taking Spiriva.     Other symptoms:  Allergies: denies nasal congestion/PND  GERD: denies  Fever/chills: denies  Fatigue: has been more tired, napping frequently  Weakness: generalized weakness   Headaches: few headaches over the last couple months  Leg swelling:  denies   Orthopnea: denies     Patient reports about 3-4 weeks ago he started smoking again; smoking about 1 PPD. Had quit 2023 until 2024.     Denies recent visits to UC, ER, PCP for breathing. Denies recent antibiotics or steroids for breathing.     No pulmonary hospitalizations. Has never been on home oxygen therapy before.    Sleep: Has been diagnosed with sleep apnea. Has a CPAP machine that he uses some but not every night.       PMHx: COPD, CAD, colon cancer, HTN, HLD, MI, depression    Surg Hx:  CABG x 4, colon resection, neck surgery with hardware, cardiac stent     Social Hx:  -smoking:   -ETOH: denies  -illicit drugs: marijuana (1-1.5 joints/week)  -occupation: disability - prior , pressure washing  -exposures: + exposure to chemicals; Denies known exposures to asbestos, silica, beryllium, molds or dusts; denies farm exposure  - had birds in the past (for about 13 years); currently has dog & cats      Fam Hx:   Family History   Problem Relation Name Age of Onset    Ovarian cancer Mother      Brain cancer Father      Breast cancer Sister      Breast cancer Sister      Lung cancer Sister              Heart attack Brother           at age 40           Prior Notes & History     From Dr. Muse note from 24           REVIEW OF SYSTEMS     REVIEW OF SYSTEMS  Review of Systems  10-point ROS complete and negative except as documented in HPI    ALLERGIES AND MEDICATIONS     ALLERGIES  No Known Allergies    MEDICATIONS  Current Outpatient Medications   Medication Sig Dispense Refill    albuterol 90  mcg/actuation inhaler Inhale 2 puffs every 4 hours if needed.      citalopram (CeleXA) 40 mg tablet Take 1 tablet (40 mg) by mouth once daily.      clopidogrel (Plavix) 75 mg tablet Take 1 tablet (75 mg) by mouth once daily.      enalapril (Vasotec) 20 mg tablet Take 1 tablet (20 mg) by mouth once daily.      hydroCHLOROthiazide (HYDRODiuril) 25 mg tablet Take 1 tablet (25 mg) by mouth once daily.      isosorbide dinitrate (Isordil) 10 mg tablet Take 1 tablet (10 mg) by mouth 2 times a day.      metoprolol succinate XL (Toprol-XL) 100 mg 24 hr tablet Take 1 tablet (100 mg) by mouth 2 times a day.      nitroglycerin (Nitrostat) 0.4 mg SL tablet Place 1 tablet (0.4 mg) under the tongue every 5 minutes if needed for chest pain.      oxyCODONE (Roxicodone) 10 mg immediate release tablet Take 1 tablet (10 mg) by mouth every 8 hours if needed for moderate pain (4 - 6) or severe pain (7 - 10).      rosuvastatin (Crestor) 20 mg tablet Take 1 tablet (20 mg) by mouth once daily.      Symbicort 160-4.5 mcg/actuation inhaler Inhale 2 puffs 2 times a day. Rinse mouth after use      tetrahydrozoline 0.05 % ophthalmic solution 1 drop if needed for irritation.      triamcinolone (Kenalog) 0.1 % cream Apply topically if needed.      famotidine (Pepcid) 40 mg tablet Take 1 tablet (40 mg) by mouth early in the morning..       No current facility-administered medications for this visit.       PAST HISTORY     PAST MEDICAL HISTORY  He  has a past medical history of Anticoagulated, Cardiology follow-up encounter, Chest tightness, Colon cancer (Multi) (2008), COPD (chronic obstructive pulmonary disease) (Multi), Depression, Emphysema lung (Multi), Former smoker, GERD (gastroesophageal reflux disease), H/O four vessel coronary artery bypass graft (2002), H/O heart artery stent (2016), History of bronchoscopy (07/27/2023), History of orthopnea, Hyperlipidemia, Hypertension, Lung nodules, Mediastinal adenopathy, MI (myocardial infarction)  (Multi), and Sleep apnea.    PAST SURGICAL HISTORY  Past Surgical History:   Procedure Laterality Date    BRONCHOSCOPY  2023    COLECTOMY  2008    CORONARY ANGIOPLASTY WITH STENT PLACEMENT  2016    CORONARY ARTERY BYPASS GRAFT         IMMUNIZATION HISTORY  Immunization History   Administered Date(s) Administered    Flu vaccine (IIV4), preservative free *Check age/dose* 10/03/2018, 09/10/2020, 2021    Flu vaccine, quadrivalent, high-dose, preservative free, age 65y+ (FLUZONE) 2024    Flu vaccine, quadrivalent, no egg protein, age 6 month or greater (FLUCELVAX) 2022    Flu vaccine, trivalent, preservative free, age 6 months and greater (Fluarix/Fluzone/Flulaval) 2015, 10/18/2016    Influenza Whole 2014    Influenza, injectable, quadrivalent 10/03/2019    Moderna COVID-19 vaccine, bivalent, blue cap/gray label *Check age/dose* 2023    Moderna SARS-CoV-2 Vaccination 2021, 2021, 2021    Novel influenza-H1N1-09, preservative-free 2009    Pneumococcal conjugate vaccine, 13-valent (PREVNAR 13) 2015    Pneumococcal conjugate vaccine, 20-valent (PREVNAR 20) 2022    RSV, 60 Years And Older (AREXVY) 2023         PHYSICAL EXAM     VITAL SIGNS: There were no vitals taken for this visit.     PREVIOUS WEIGHTS:  Wt Readings from Last 3 Encounters:   24 93.9 kg (207 lb 0.2 oz)       Physical Exam  Deferred in setting of telehealth visit.    RESULTS/DATA     Pulmonary Function Test Results     No PFT on record    Chest Radiograph     No results found for this or any previous visit from the past 365 days.    PET CT      [facility] imagin/10/24 NM PET MR skull base to MID thigh  Impression    Mild interval increase in metabolic activity within enlarged thoracic lymph  nodes since 2023.    No new hypermetabolic foci are identified in the neck, chest chest abdomen or  pelvis  Narrative    EXAMINATION:  WHOLE BODY  PET/CT    6/10/2024    TECHNIQUE:  Following IV injection of 16.1 mCi of F-18 FDG, PET  tumor imaging was  acquired from the base of the skull to the mid thighs.  Computed tomography  was used for purposes of attenuation correction and anatomic localization.  Fusion imaging was utilized for interpretation.    Uptake time 61 min.  Glucose level 85 mg/dl.    COMPARISON:  PET-CT dated 06/26/2023.    HISTORY:  ORDERING SYSTEM PROVIDED HISTORY: Solitary pulmonary nodule  TECHNOLOGIST PROVIDED HISTORY:  What reading provider will be dictating this exam?->CRC    FINDINGS:  HEAD/NECK: There is no evidence of hypermetabolic activity within the image  portion of the intracranial space or scalp.  No hypermetabolic cervical lymph  node is identified.    CHEST: Hypermetabolic activity within nonenlarged right supraclavicular lymph  nodes have a maximal SUV of 5.1 as compared to 5.0 on the prior examination.    Hypermetabolism within right paratracheal the nodes has a maximal SUV of 9.2  as compared 8.2 on the prior examination.  Hypermetabolic activity within  subcarinal, right and left hilar lymph nodes have slightly increased with the  maximal SUV measuring 11.6, 10.3 and 6.5 respectively, as compared to 10.7,  7.7 and 6.1 on the prior examination.  These lymph nodes have not  significantly changed in size.  There is no evidence of new hypermetabolic  pulmonary parenchymal foci or thoracic lymph node enlargement.    ABDOMEN/PELVIS: There is no evidence of focal hypermetabolism within the  liver, spleen, pancreas or adrenals.  No definite focal hypermetabolic  activity is identified within bowel.  There is no evidence of hypermetabolic  abdominal or pelvic lymph nodes.    BONES/SOFT TISSUE: There is no evidence of osseous hypermetabolic foci.    INCIDENTAL CT FINDINGS: Left hydrocele is identified.  The gallbladder is  surgically absent    6/26/23 NM PET MR skull base to MID thigh  Impression    Hypermetabolic lymph nodes  throughout the mediastinum and at the jaci  bilaterally, and to a lesser extent within the cervical chains and inguinal  regions, for which lymphoma, chantale metastatic disease, or sarcoid are among  leading differential considerations.    Nonspecific diffuse activity is seen associated with right-sided airspace  disease and left pleural thickening, potentially inflammatory.  Continued  attention on CT follow-up is recommended.    RECOMMENDATIONS:  Unavailable  Narrative    EXAMINATION:  Skull base to midthigh PET/CT    6/26/2023    TECHNIQUE:  Following IV injection of 15 mCi of F-18 FDG, PET  tumor imaging was acquired  from the base of the skull to the mid thighs.  Computed tomography was used  for purposes of attenuation correction and anatomic localization.  Fusion  imaging was utilized for interpretation.    Uptake time 52 min.  Glucose level 95 mg/dl.    COMPARISON:  None    HISTORY:  ORDERING SYSTEM PROVIDED HISTORY: Solitary pulmonary nodule  TECHNOLOGIST PROVIDED HISTORY:  What reading provider will be dictating this exam?->CRC    Initial evaluation    FINDINGS:  HEAD/NECK: Activity is seen within shotty lymph nodes within bilateral  cervical chains.  Postoperative changes seen of the cervical spine.  Activity  at the vocal cords, likely physiologic.  Bilateral carotid artery  calcification.    CHEST: Hypermetabolic lymph nodes are seen within the superior mediastinum,  right paratracheal, pretracheal, precarinal, subcarinal, bilateral hilar  regions.  As an example, subcarinal lymph node measures approximately 2.8 cm  in short axis, SUV maximum 10.7.  No hypermetabolic axillary adenopathy.    Emphysema is present.  Partially calcified left pleural plaque is  demonstrated with diffuse intermediate level activity.  Heterogeneous and  ground-glass opacity within the peripheral right lower lobe in mid lung is  associated with diffuse intermediate level activity.    ABDOMEN/PELVIS: Excretion of activity is seen  from bilateral kidneys and  activity is seen within the urinary bladder.  Bowel activity is seen which  can be physiologically variable.    Activity is seen within nonenlarged lymph nodes within the right greater than  left inguinal regions.    Status post cholecystectomy.  Calcification of the abdominal aorta and iliac  arteries.  Calcification of prostate    Echocardiogram & Cardiac Studies     No Echocardiogram on file    EKG 8/14/24:   Sinus bradycardia  Nonspecific intraventricular block  Abnormal ECG  No previous ECGs available  Confirmed by Easton Aleman ([id number]) on 8/15/2024 8:32:02 AM       Labwork & Pathology   Complete Blood Count  Results from last 7 days   Lab Units 08/14/24  1632   WBC AUTO x10*3/uL 6.7   HEMOGLOBIN g/dL 13.9   HEMATOCRIT % 41.5   PLATELETS AUTO x10*3/uL 234   NEUTROS PCT AUTO % 62.6   LYMPHS PCT AUTO % 22.0   MONOS PCT AUTO % 10.5   EOS PCT AUTO % 3.7        Results from last 7 days   Lab Units 08/14/24  1632   SODIUM mmol/L 133*   POTASSIUM mmol/L 3.8   CHLORIDE mmol/L 97*   CO2 mmol/L 28   BUN mg/dL 11   CREATININE mg/dL 1.00   CALCIUM mg/dL 9.0   GLUCOSE mg/dL 76         Bronchoscopy & Pathology/Cultures     Respiratory Culture  Lab Results   Component Value Date    RESPCULTSM  07/27/2023       ~NORMAL THROAT ZE, CULTURE IN PROGRESS.~  2+ NORMAL THROAT ZE.    GRAMSTAIN NO GRANULOCYTES OR ORGANISMS SEEN. 07/27/2023       Fungal Culture  Lab Results   Component Value Date    FUNGALCULTSM Candida glabrata (A) 07/27/2023    FUNGALSMEAR FLUORESCENT FUNGAL STAIN: NEGATIVE 07/27/2023       AFB Culture  Lab Results   Component Value Date    AFBCX NO MYCOBACTERIA ISOLATED. 07/27/2023    AFBSTAIN ACID FAST SMEAR - NEGATIVE. 07/27/2023 7/27/23 Converted Surgical Pathology  FINAL DIAGNOSIS  A: LYMPH NODE, 11 RS, CORE BIOPSY:  --  LYMPH NODE WITH FOLLICULAR AND PARAFOLLICULAR HYPERPLASIA, FAVOR REACTIVE.  SEE NOTE.    : KRISTYN Tran reviewed the slides, flow  cytometry  studies and agrees with the diagnosis on 08/03/2023.  Dr. Shola Walker reviewed few immunostains and agreed with the  interpretation of negative for carcinoma.    NOTE: Per electronic medical records, patient's clinical note mentioned lung  nodules; imaging studies from outside hospital performed on 06/26/2023  demonstrated hypermetabolic lymph nodes in the mediastinum, bilateral hilar,  cervical chain and inguinal lymph nodes. Overall, the morphologic and  immunophenotypic findings showed a lymph node with follicular and  parafollicular hyperplasia, favoring a reactive process. Correlation with  pending B-cell rearrangement studies is recommended. If lymphadenopathy  persists or if there is a high clinical suspicion for lymphoproliferative  disorder exists, an excisional lymph node biopsy with flow cytometry studies is  recommended.      MORPHOLOGY: The histologic sections showed fragments of lymph node with  reactive appearing follicles and parafollicular T-cell areas.    IMMUNOHISTOCHEMISTRY:  Immunohistochemical stains performed on block A1 demonstrate the following  results:       CD20 and CD19: Highlights B-lymphocytes primarily in follicles.       CD3: Highlights T-lymphocytes primarily in the paracortex.       CD5: Highlights T-lymphocytes primarily in the paracortex.       CD10: Highlights germinal centers.       BCL6: Highlights germinal centers.       BCL2: Negative in germinal centers.       CD21 and CD23: Highlights intact follicular dendritic meshworks.       Ki67: Shows high proliferative index in reactive germinal centers.       CyclinD1: Negative.       NOE JODRON: Negative.       INSM1, TTF-1 and P40 are negative.         CD30: Highlights scattered cells.       C-MYC: Highlights scattered cells.       MUM1: Highlights plasma/plasmacytic cells.           FLOW CYTOMETRY: No clonal B-cell or abnormal T-cell population identified.  See  separate report for details.    CYTOGENETIC/MOLECULAR  STUDIES: B-cell rearrangement studies are pending and the  results will be reported as an addendum.    Immunostains were performed in addition to flow cytometry to full characterize  the phenotype, architecture, and extent of the atypical population(s).  This  was medically necessary for the best possible diagnosis.    The gross and/or microscopic findings were reviewed in conjunction with  pathology resident, Candace Dorantes MD.       ASSESSMENT/PLAN   Sam Patterson is a 63 y.o. male (current smoker, ~100 pack year history) with a PMHx of COPD, CAD, colon cancer, HTN, HLD, MI, depression. Scheduled with [facility] for an evaluation prior to upcoming bronchoscopy with biopsy for mediastinal lymphadenopathy.     Patient follows with Dr. Muse for pulmonary. Initially had bronch with biopsy 7/27/23 and findings were benign/inconclusive. Referred back to  for concern for mediastinal lymphadenopathy after PET CT 6/10/24 showed mildly increased hyperactivity of enlarged thoracic LNs compared to PET from 6/26/23.     Problem List and Orders  Problem List Items Addressed This Visit             ICD-10-CM    Mediastinal adenopathy - Primary R59.0    COPD, very severe (Multi) J44.9    Cigarette smoker F17.210    Obstructive sleep apnea of adult G47.33       Assessment and Plan / Recommendations:    1) Mediastinal LAD   - Plan for bronchoscopy with biopsy (Airway Examination, Diagnostic EBUS)  - Lab work prior to procedure  (completed 8/14/24)  - No pre-procedure COVID test needed as of 10/11/22    - Is on Plavix, last dose 8/15/24. Verbalized understanding that today 8/15/24 is the last day he should take the Plavix prior to his procedure as it needs held 5 days prior. (Cardiac clearance to hold Plavix for 5 days scanned in chart)  - Last use of nitroglycerin was about 1 month ago.     I explained the procedure to the patient. We discussed that the bronchoscopy will be performed by a  member of the Interventional Pulmonary Team; depending on scheduling and provider availability. We also discussed that the IP providers function as a team and not infrequently may have to fill in for one another if there are emergent issues that need attention at the same time. The patient / family expressed understanding and agreed to proceed. All questions were answered.      2) Current smoker - Patient reports about 3-4 weeks ago he started smoking again; smoking about 1 PPD. Had quit 06/2023 until 07/2024.   - Encouraged patient to stop smoking prior to his procedure because smoking increases risk of postoperative pulmonary complications; he verbalized understanding.   - Encouraged him to reach out to his pulmonologist if needs assistance stopping smoking again.     3) KARI on CPAP - patient is not using his CPAP regularly as prescribed  - encouarged him to use CPAP every night  - he also reports he was scheduled for a sleep study in the evening after his bronch/biopsy, I advised since he will be receiving general anesthesia the same day it is best to reschedule his sleep study    4) COPD - follows with Dr. Muse  - advised to continue use of Symbicort 2 puffs VID and albuterol PRN      Patient's visit was converted to a virtual visit.    Spoke with the patient via telephone for 25 minutes.      Prep: 15 minutes     Phone: 25 minutes     Total: 40 minutes       Will plan to follow with his pulmonologist, Dr. Muse, after procedure.

## 2024-08-21 ENCOUNTER — ANESTHESIA (OUTPATIENT)
Dept: GASTROENTEROLOGY | Facility: HOSPITAL | Age: 64
End: 2024-08-21
Payer: MEDICARE

## 2024-08-21 ENCOUNTER — HOSPITAL ENCOUNTER (OUTPATIENT)
Dept: GASTROENTEROLOGY | Facility: HOSPITAL | Age: 64
Discharge: HOME | End: 2024-08-21
Payer: MEDICARE

## 2024-08-21 ENCOUNTER — APPOINTMENT (OUTPATIENT)
Dept: PULMONOLOGY | Facility: CLINIC | Age: 64
End: 2024-08-21
Payer: MEDICARE

## 2024-08-21 ENCOUNTER — ANESTHESIA EVENT (OUTPATIENT)
Dept: GASTROENTEROLOGY | Facility: HOSPITAL | Age: 64
End: 2024-08-21
Payer: MEDICARE

## 2024-08-21 VITALS
BODY MASS INDEX: 31.02 KG/M2 | TEMPERATURE: 97.7 F | HEIGHT: 69 IN | RESPIRATION RATE: 17 BRPM | SYSTOLIC BLOOD PRESSURE: 102 MMHG | HEART RATE: 61 BPM | WEIGHT: 209.44 LBS | DIASTOLIC BLOOD PRESSURE: 61 MMHG | OXYGEN SATURATION: 94 %

## 2024-08-21 DIAGNOSIS — R59.1 LYMPHADENOPATHY: ICD-10-CM

## 2024-08-21 PROCEDURE — 2720000007 HC OR 272 NO HCPCS

## 2024-08-21 PROCEDURE — 2500000004 HC RX 250 GENERAL PHARMACY W/ HCPCS (ALT 636 FOR OP/ED): Performed by: ANESTHESIOLOGIST ASSISTANT

## 2024-08-21 PROCEDURE — 7100000002 HC RECOVERY ROOM TIME - EACH INCREMENTAL 1 MINUTE

## 2024-08-21 PROCEDURE — 7100000010 HC PHASE TWO TIME - EACH INCREMENTAL 1 MINUTE

## 2024-08-21 PROCEDURE — 3700000002 HC GENERAL ANESTHESIA TIME - EACH INCREMENTAL 1 MINUTE

## 2024-08-21 PROCEDURE — 7100000001 HC RECOVERY ROOM TIME - INITIAL BASE CHARGE

## 2024-08-21 PROCEDURE — 31653 BRONCH EBUS SAMPLNG 3/> NODE: CPT | Performed by: INTERNAL MEDICINE

## 2024-08-21 PROCEDURE — 2500000005 HC RX 250 GENERAL PHARMACY W/O HCPCS: Performed by: ANESTHESIOLOGIST ASSISTANT

## 2024-08-21 PROCEDURE — 7100000009 HC PHASE TWO TIME - INITIAL BASE CHARGE

## 2024-08-21 PROCEDURE — 2500000001 HC RX 250 WO HCPCS SELF ADMINISTERED DRUGS (ALT 637 FOR MEDICARE OP): Performed by: ANESTHESIOLOGIST ASSISTANT

## 2024-08-21 PROCEDURE — 3700000001 HC GENERAL ANESTHESIA TIME - INITIAL BASE CHARGE

## 2024-08-21 RX ORDER — FENTANYL CITRATE 50 UG/ML
INJECTION, SOLUTION INTRAMUSCULAR; INTRAVENOUS AS NEEDED
Status: DISCONTINUED | OUTPATIENT
Start: 2024-08-21 | End: 2024-08-21

## 2024-08-21 RX ORDER — ONDANSETRON HYDROCHLORIDE 2 MG/ML
INJECTION, SOLUTION INTRAVENOUS AS NEEDED
Status: DISCONTINUED | OUTPATIENT
Start: 2024-08-21 | End: 2024-08-21

## 2024-08-21 RX ORDER — ONDANSETRON HYDROCHLORIDE 2 MG/ML
4 INJECTION, SOLUTION INTRAVENOUS ONCE AS NEEDED
Status: DISCONTINUED | OUTPATIENT
Start: 2024-08-21 | End: 2024-08-22 | Stop reason: HOSPADM

## 2024-08-21 RX ORDER — LIDOCAINE HYDROCHLORIDE 40 MG/ML
SOLUTION TOPICAL AS NEEDED
Status: DISCONTINUED | OUTPATIENT
Start: 2024-08-21 | End: 2024-08-21

## 2024-08-21 RX ORDER — LIDOCAINE HYDROCHLORIDE 20 MG/ML
INJECTION, SOLUTION EPIDURAL; INFILTRATION; INTRACAUDAL; PERINEURAL AS NEEDED
Status: DISCONTINUED | OUTPATIENT
Start: 2024-08-21 | End: 2024-08-21

## 2024-08-21 RX ORDER — PROMETHAZINE HYDROCHLORIDE 25 MG/ML
6.25 INJECTION, SOLUTION INTRAMUSCULAR; INTRAVENOUS ONCE AS NEEDED
Status: DISCONTINUED | OUTPATIENT
Start: 2024-08-21 | End: 2024-08-22 | Stop reason: HOSPADM

## 2024-08-21 RX ORDER — PROPOFOL 10 MG/ML
INJECTION, EMULSION INTRAVENOUS AS NEEDED
Status: DISCONTINUED | OUTPATIENT
Start: 2024-08-21 | End: 2024-08-21

## 2024-08-21 RX ORDER — OXYCODONE HYDROCHLORIDE 5 MG/1
5 TABLET ORAL EVERY 4 HOURS PRN
Status: DISCONTINUED | OUTPATIENT
Start: 2024-08-21 | End: 2024-08-22 | Stop reason: HOSPADM

## 2024-08-21 RX ORDER — ROCURONIUM BROMIDE 10 MG/ML
INJECTION, SOLUTION INTRAVENOUS AS NEEDED
Status: DISCONTINUED | OUTPATIENT
Start: 2024-08-21 | End: 2024-08-21

## 2024-08-21 RX ORDER — ACETAMINOPHEN 325 MG/1
650 TABLET ORAL EVERY 4 HOURS PRN
Status: DISCONTINUED | OUTPATIENT
Start: 2024-08-21 | End: 2024-08-22 | Stop reason: HOSPADM

## 2024-08-21 RX ORDER — MIDAZOLAM HYDROCHLORIDE 1 MG/ML
INJECTION INTRAMUSCULAR; INTRAVENOUS AS NEEDED
Status: DISCONTINUED | OUTPATIENT
Start: 2024-08-21 | End: 2024-08-21

## 2024-08-21 RX ORDER — SODIUM CHLORIDE, SODIUM LACTATE, POTASSIUM CHLORIDE, CALCIUM CHLORIDE 600; 310; 30; 20 MG/100ML; MG/100ML; MG/100ML; MG/100ML
INJECTION, SOLUTION INTRAVENOUS CONTINUOUS PRN
Status: DISCONTINUED | OUTPATIENT
Start: 2024-08-21 | End: 2024-08-21

## 2024-08-21 RX ORDER — GLYCOPYRROLATE 0.2 MG/ML
INJECTION INTRAMUSCULAR; INTRAVENOUS AS NEEDED
Status: DISCONTINUED | OUTPATIENT
Start: 2024-08-21 | End: 2024-08-21

## 2024-08-21 RX ORDER — SODIUM CHLORIDE, SODIUM LACTATE, POTASSIUM CHLORIDE, CALCIUM CHLORIDE 600; 310; 30; 20 MG/100ML; MG/100ML; MG/100ML; MG/100ML
100 INJECTION, SOLUTION INTRAVENOUS CONTINUOUS
Status: DISCONTINUED | OUTPATIENT
Start: 2024-08-21 | End: 2024-08-22 | Stop reason: HOSPADM

## 2024-08-21 RX ORDER — PHENYLEPHRINE HCL IN 0.9% NACL 1 MG/10 ML
SYRINGE (ML) INTRAVENOUS AS NEEDED
Status: DISCONTINUED | OUTPATIENT
Start: 2024-08-21 | End: 2024-08-21

## 2024-08-21 RX ORDER — ALBUTEROL SULFATE 0.83 MG/ML
2.5 SOLUTION RESPIRATORY (INHALATION) ONCE AS NEEDED
Status: DISCONTINUED | OUTPATIENT
Start: 2024-08-21 | End: 2024-08-22 | Stop reason: HOSPADM

## 2024-08-21 ASSESSMENT — PAIN SCALES - GENERAL
PAINLEVEL_OUTOF10: 0 - NO PAIN

## 2024-08-21 ASSESSMENT — PAIN - FUNCTIONAL ASSESSMENT
PAIN_FUNCTIONAL_ASSESSMENT: 0-10

## 2024-08-21 ASSESSMENT — COLUMBIA-SUICIDE SEVERITY RATING SCALE - C-SSRS
2. HAVE YOU ACTUALLY HAD ANY THOUGHTS OF KILLING YOURSELF?: NO
1. IN THE PAST MONTH, HAVE YOU WISHED YOU WERE DEAD OR WISHED YOU COULD GO TO SLEEP AND NOT WAKE UP?: NO
6. HAVE YOU EVER DONE ANYTHING, STARTED TO DO ANYTHING, OR PREPARED TO DO ANYTHING TO END YOUR LIFE?: NO

## 2024-08-21 NOTE — LETTER
Dear, Sam Patterson      8/13/2024                                                            INFORMATION FOR YOUR PROCEDURE     INSTRUCTIONS MUST BE FOLLOWED OR YOU RUN THE RISK OF YOUR CASE BEING CANCELED     Information is attached to this e-mail for your upcoming procedure. (Look for the paperclip in your email to access this information)  Lab requisitions are also included as well as procedural instructions.    You are scheduled for your Bronchoscopy on 8/21/24,     with Dr. Yue Wood    Arrival is at  8:30  AM,  for Check In prior to your actual procedure time. This allows us to prepare you for your procedure.  Location:   [place] [address]    Check In:  2nd Floor OR Waiting Room  Approach the  for Check In    Patient information is right there if assistance is needed.    NPO (No food or drink) after midnight the night before your procedure. This includes coffee, water, and soda, hard candy, gum or mints.  If taking your morning medications, a small sip of water is allowed to get the medication down.    For your safety, you must have a responsible, adult  accompany you to your procedure.  You will not be permitted to drive yourself home if you have received any type of anesthesia or sedation.    Automated calls about your upcoming scheduled appointments can be quite confusing for patients.  The times may vary depending on what you have scheduled for procedure day. Follow the time/s I have given you  so there is no confusion.    Blood work will need to be done prior to your procedure, preferably at a  Facility.  There are no restrictions for testing. I have attached a requisition for you.    PAT (Pre Admission Testing) Will call you to set up your appointment at [facility] . This appointment must be before 8/21/24.        Our Nurse Practitioner, Aleksandra Tristan CNP, will call you on 8/15/24, @ 9:20 AM    This is a phone visit to  go over your medical history prior to your procedure, and is a necessary part of your workup.  The patient must be present at this phone visit.    Please reach out to me with any questions you may have Edwina @ [phone] or   Andrae @ [phone]    Have a nice day!    Edwina Garner    Bronchoscopy   Interventional Pulmonology    MD Yue Hernandez MD Sameer Avasarala, MD Catalina Teba, MD Andrew Dunatchik, MD        [facility]  Pulmonary, Critical Care and Sleep Medicine  [address] [place] [address]  P -[phone]   [phone]  [email or web address]

## 2024-08-21 NOTE — ANESTHESIA POSTPROCEDURE EVALUATION
Patient: Sam Patterson    Procedure Summary       Date: 08/21/24 Room / Location: Formerly named [facility]    Anesthesia Start: 1008 Anesthesia Stop: 1132    Procedure: BRONCHOSCOPY Diagnosis: Lymphadenopathy    Scheduled Providers: Yue Wood MD; Jaylen Packer MD; BELEN Cardozo; Adelaide Humphreys RN Responsible Provider: Jaylen Packer MD    Anesthesia Type: general ASA Status: 3            Anesthesia Type: general    Vitals Value Taken Time   /61 08/21/24 1300   Temp 36.5 °C (97.7 °F) 08/21/24 1300   Pulse 61 08/21/24 1300   Resp 17 08/21/24 1300   SpO2 93 % 08/21/24 1300       Anesthesia Post Evaluation    Patient location during evaluation: PACU  Patient participation: complete - patient participated  Level of consciousness: awake and alert  Pain management: adequate  Airway patency: patent  Cardiovascular status: acceptable and hemodynamically stable  Respiratory status: acceptable, spontaneous ventilation and nonlabored ventilation  Hydration status: acceptable  Postoperative Nausea and Vomiting: none        There were no known notable events for this encounter.

## 2024-08-21 NOTE — ANESTHESIA PROCEDURE NOTES
Airway  Date/Time: 8/21/2024 10:24 AM  Urgency: elective      Staffing  Performed: BELEN   Authorized by: Jaylen Packer MD    Performed by: BELEN Cardozo  Patient location during procedure: OR    Indications and Patient Condition  Indications for airway management: anesthesia and airway protection  Spontaneous Ventilation: absent  Sedation level: deep  Preoxygenated: yes  Patient position: sniffing  Mask difficulty assessment: 1 - vent by mask  Planned trial extubation    Final Airway Details  Final airway type: endotracheal airway      Successful airway: ETT  Cuffed: yes   Successful intubation technique: direct laryngoscopy  Blade: Stacey  Blade size: #4  ETT size (mm): 8.5  Cormack-Lehane Classification: grade IIb - view of arytenoids or posterior of glottis only  Placement verified by: chest auscultation and capnometry   Measured from: teeth  ETT to teeth (cm): 22  Number of attempts at approach: 1

## 2024-08-21 NOTE — ANESTHESIA PREPROCEDURE EVALUATION
Patient: Sam Patterson    Procedure Information       Date/Time: 08/21/24 1000    Scheduled providers: Yue Wood MD; Jaylen Packer MD; BELEN Cardozo; Adelaide Humphreys RN    Procedure: BRONCHOSCOPY    Location: [facility]            Relevant Problems   Cardiac   (+) Coronary arteriosclerosis      Pulmonary   (+) COPD, very severe (Multi)   (+) Obstructive sleep apnea of adult      Musculoskeletal   (+) Cervical spondylosis   (+) Osteoarthritis of both knees       Clinical information reviewed:   Tobacco  Allergies  Meds   Med Hx  Surg Hx   Fam Hx  Soc Hx         Past Medical History:   Diagnosis Date    Anticoagulated     Plavix    Colon cancer (Multi) 2008    s/p colectomy and adjuvant chemo    COPD (chronic obstructive pulmonary disease) (Multi)     Depression     GERD (gastroesophageal reflux disease)     H/O four vessel coronary artery bypass graft 2002    H/O heart artery stent 2016    Hyperlipidemia     Hypertension     Lung nodules     Mediastinal adenopathy     Most recent PET CT from 6/10/24 showing mildly increased hypermetabolic activity within enlarged thoracic lymph nodes upon comparison from 6/26/23 PET CT    MI (myocardial infarction) (Multi)     Sleep apnea     severe KARI; split night study on 1/29/24. Wears CPAP intermittently.      Past Surgical History:   Procedure Laterality Date    ANTERIOR CERVICAL DISCECTOMY W/ FUSION  12/2010    BRONCHOSCOPY  07/27/2023    CERVICAL FUSION  2012    Posterior C3-6    COLECTOMY  2007    CORONARY ANGIOPLASTY WITH STENT PLACEMENT  2018    CORONARY ARTERY BYPASS GRAFT  2002     Social History     Tobacco Use    Smoking status: Every Day     Current packs/day: 1.00     Average packs/day: 2.5 packs/day for 51.6 years (128.8 ttl pk-yrs)     Types: Cigarettes     Start date: 1973     Passive exposure: Past (Dad smoked)    Smokeless tobacco: Never    Tobacco comments:     Quit once for a year from 06/2023-06/2024. Recently started smoking  "again, less than one pack daily.   Vaping Use    Vaping status: Never Used   Substance Use Topics    Alcohol use: Yes     Comment: 2-3 a year    Drug use: Yes     Types: Marijuana     Comment: Smokes a joint a week      Current Outpatient Medications   Medication Instructions    albuterol 90 mcg/actuation inhaler 2 puffs, inhalation, Every 4 hours PRN    citalopram (CELEXA) 40 mg, oral, Daily    clopidogrel (PLAVIX) 75 mg, oral, Daily    enalapril (VASOTEC) 20 mg, oral, Daily    famotidine (Pepcid) 40 mg tablet 1 tablet, oral, Daily (0630)    hydroCHLOROthiazide (HYDRODIURIL) 25 mg, oral, Daily    isosorbide dinitrate (ISORDIL) 10 mg, oral, 2 times daily (0900,1400)    metoprolol succinate XL (TOPROL-XL) 100 mg, oral, 2 times daily    nitroglycerin (NITROSTAT) 0.4 mg, sublingual, Every 5 min PRN    oxyCODONE (ROXICODONE) 10 mg, oral, Every 8 hours PRN    rosuvastatin (CRESTOR) 20 mg, oral, Daily    Symbicort 160-4.5 mcg/actuation inhaler 2 puffs, inhalation, 2 times daily, Rinse mouth after use    tetrahydrozoline 0.05 % ophthalmic solution 1 drop, As needed    triamcinolone (Kenalog) 0.1 % cream Topical, As needed      No Known Allergies     Chemistry    Lab Results   Component Value Date/Time     (L) 08/14/2024 1632    K 3.8 08/14/2024 1632    CL 97 (L) 08/14/2024 1632    CO2 28 08/14/2024 1632    BUN 11 08/14/2024 1632    CREATININE 1.00 08/14/2024 1632    Lab Results   Component Value Date/Time    CALCIUM 9.0 08/14/2024 1632          No results found for: \"HGBA1C\"  Lab Results   Component Value Date/Time    WBC 6.7 08/14/2024 1632    HGB 13.9 08/14/2024 1632    HCT 41.5 08/14/2024 1632     08/14/2024 1632     No results found for: \"PROTIME\", \"PTT\", \"INR\"  No results found for: \"ABORH\"  Encounter Date: 08/14/24   ECG 12 Lead   Result Value    Ventricular Rate 59    Atrial Rate 59    ID Interval 176    QRS Duration 130    QT Interval 498    QTC Calculation(Bazett) 493    P Axis 23    R Axis 77    T " "Havana 38    QRS Count 10    Q Onset 202    P Onset 114    P Offset 170    T Offset 451    QTC Fredericia 495    Narrative    Sinus bradycardia  Nonspecific intraventricular block  Abnormal ECG  No previous ECGs available  Confirmed by Easton Aleman ([id number]) on 8/15/2024 8:32:02 AM     No results found for this or any previous visit from the past 1095 days.     Cardiac cath 3/28/2018@[email or web address]:  Significant Diagnostic Studies: angiography: IMPRESSION:  1.  Significant disease involving the ostial and proximal segment of the  first OM branch with a successful percutaneous coronary intervention with a  deployment of 2.75 x 18 mm Xience Alpine drug-eluting stent.  2.  Totally occluded LAD with a patent KRISTIE to LAD.  3.  Totally occluded second OM branch with a patent LIMA to OM II  4.  Totally occluded SVG graft to right PDA known from previous cardiac  catheterization.  5.  Totally occluded SVG graft to diagonal branch known from previous  cardiac catheterization.  6.  Totally occluded RCA.    Visit Vitals  /68   Pulse 59   Temp 36 °C (96.8 °F) (Temporal)   Resp 16   Ht 1.753 m (5' 9\")   Wt 95 kg (209 lb 7 oz)   SpO2 94%   BMI 30.93 kg/m²   Smoking Status Every Day   BSA 2.15 m²     NPO/Void Status  Carbohydrate Drink Given Prior to Surgery? : N  Date of Last Liquid: 08/21/24  Time of Last Liquid: 0700  Date of Last Solid: 08/20/24  Time of Last Solid: 1800  Last Intake Type: Clear fluids  Time of Last Void: 0931        Physical Exam    Airway  Mallampati: III  TM distance: >3 FB  Neck ROM: full     Cardiovascular - normal exam  Rhythm: regular  Rate: normal     Dental    Pulmonary - normal exam     Abdominal - normal exam       Other findings: 7/2023: easy mask, mac 4, grade 2, 1 attempt          Anesthesia Plan    History of general anesthesia?: yes  History of complications of general anesthesia?: no    ASA 3     general   (General with ETT. Standard ASA monitoring)  intravenous induction   Postoperative " administration of opioids is intended.  Anesthetic plan and risks discussed with patient.    Plan discussed with CAA and CRNA.

## 2024-08-21 NOTE — PERIOPERATIVE NURSING NOTE
1301 Assuming care of pt at this time. Bedside report received from outgoing RN.     1315 Discharge instructions provided to pt and family. Educated on medications, effects of anesthesia, and homecoming care. Pt and family verbalizing understanding of all instructions provided at this time. All questions and concerns answered. Pt given contact information for provider.     1320 Pt assisted with dressing with help of family. IV removed dressing applied.     1330 Pt assisted to main lobby via wc by transport. Dc in stable condition to home. All belongings taken with pt.

## 2024-08-22 ASSESSMENT — PAIN SCALES - GENERAL: PAINLEVEL_OUTOF10: 0 - NO PAIN

## 2024-08-23 LAB
LABORATORY COMMENT REPORT: NORMAL
LABORATORY COMMENT REPORT: NORMAL
PATH REPORT.FINAL DX SPEC: NORMAL
PATH REPORT.GROSS SPEC: NORMAL
PATH REPORT.INTRAOP OBS SPEC DOC: NORMAL
PATH REPORT.RELEVANT HX SPEC: NORMAL
PATH REPORT.TOTAL CANCER: NORMAL

## 2024-08-26 LAB
CELL COUNT (BLOOD): 0.24 X10*3/UL
CELL POPULATIONS: NORMAL
DIAGNOSIS: NORMAL
FLOW DIFFERENTIAL: NORMAL
FLOW TEST ORDERED: NORMAL
LAB TEST METHOD: NORMAL
NUMBER OF CELLS COLLECTED: NORMAL
PATH REPORT.TOTAL CANCER: NORMAL
SIGNATURE COMMENT: NORMAL
SPECIMEN VIABILITY: NORMAL

## 2024-08-28 LAB
LAB AP ASR DISCLAIMER: NORMAL
LABORATORY COMMENT REPORT: NORMAL
PATH REPORT.FINAL DX SPEC: NORMAL
PATH REPORT.GROSS SPEC: NORMAL
PATH REPORT.TOTAL CANCER: NORMAL

## 2024-11-13 NOTE — DISCHARGE INSTRUCTIONS
The anesthetics, sedatives or narcotics which were given to you today will be acting in your body for the next 24 hours, so you might feel a little sleepy or groggy. This feeling should slowly wear off.   Carefully read and follow the instructions below:   You received sedation today.   Do not drive or operate machinery or power tools of any kind.   No alcoholic beverages today, not even beer or wine.   No over the counter medications that contain alcohol or may cause drowsiness.   Do not make important decisions or sign legal documents.     Do not use Aspirin containing products or non-steroidal medications for the next 24 hours.  (Examples of these types of medications include: Advil, Aleve, Ecotrin, Ibuprofen, Motrin or Naprosyn.  This list is not all-inclusive.  Check with your physician or pharmacist before resuming these medications.)  Tylenol, cough medicine, cough drops or throat lozenges may be used when you are allowed to resume eating and drinking.     Call your physician if any of these symptoms occur:   High fever over 101 degrees or chills (a low grade fever is common for 24 hours)   Rash or hives   Persistent nausea or vomiting   Inability to urinate within 8 hours after the procedure  Go directly to the emergency room if you notice any of the following:   Shortness of breath   Chest pain  Coughing up large amounts of bright red blood greater than a teaspoonful of blood clots (about a teaspoonful for the next 24-48 hours is normal, especially if you had a biopsy)  Resume all normal medications unless directed otherwise by your doctor.     Follow up with your referring physician as previously scheduled.    If you experience any problems or have any questions following discharge, please call:   Before 5 pm: [phone]   After 5pm and on weekends: [phone] / [phone] and ask for the Pulmonary Fellow on-call (Pager Number: [phone])     Statement Selected

## 2025-08-04 ENCOUNTER — TRANSCRIBE ORDERS (OUTPATIENT)
Dept: ADMINISTRATIVE | Age: 65
End: 2025-08-04

## 2025-08-04 DIAGNOSIS — J43.9 PULMONARY EMPHYSEMA, UNSPECIFIED EMPHYSEMA TYPE (HCC): Primary | ICD-10-CM

## 2025-08-04 DIAGNOSIS — R59.0 MEDIASTINAL LYMPHADENOPATHY: Primary | ICD-10-CM
